# Patient Record
Sex: FEMALE | Race: WHITE | HISPANIC OR LATINO | Employment: FULL TIME | URBAN - METROPOLITAN AREA
[De-identification: names, ages, dates, MRNs, and addresses within clinical notes are randomized per-mention and may not be internally consistent; named-entity substitution may affect disease eponyms.]

---

## 2017-01-19 ENCOUNTER — GENERIC CONVERSION - ENCOUNTER (OUTPATIENT)
Dept: OTHER | Facility: OTHER | Age: 52
End: 2017-01-19

## 2017-04-03 ENCOUNTER — ALLSCRIPTS OFFICE VISIT (OUTPATIENT)
Dept: OTHER | Facility: OTHER | Age: 52
End: 2017-04-03

## 2017-04-06 ENCOUNTER — LAB CONVERSION - ENCOUNTER (OUTPATIENT)
Dept: OTHER | Facility: OTHER | Age: 52
End: 2017-04-06

## 2017-04-06 LAB
FERRITIN SERPL-MCNC: 36 NG/ML (ref 10–232)
IRON SATN MFR SERPL: 15 % (CALC) (ref 11–50)
IRON SERPL-MCNC: 50 MCG/DL (ref 40–190)
TIBC SERPL-MCNC: 323 MCG/DL (CALC) (ref 250–450)
TRANSFERRIN (HISTORICAL): 226 MG/DL (ref 188–341)

## 2017-09-15 ENCOUNTER — ALLSCRIPTS OFFICE VISIT (OUTPATIENT)
Dept: OTHER | Facility: OTHER | Age: 52
End: 2017-09-15

## 2017-09-15 DIAGNOSIS — E06.3 AUTOIMMUNE THYROIDITIS: ICD-10-CM

## 2017-09-15 DIAGNOSIS — E03.9 HYPOTHYROIDISM: ICD-10-CM

## 2017-10-04 ENCOUNTER — ALLSCRIPTS OFFICE VISIT (OUTPATIENT)
Dept: OTHER | Facility: OTHER | Age: 52
End: 2017-10-04

## 2017-10-05 NOTE — PROGRESS NOTES
Assessment  1  Systemic lupus erythematosus (710 0) (M32 9)   2  Autoimmune hemolytic anemia (283 0) (D59 1)   3  Hashimoto's thyroiditis (245 2) (E06 3)   4  Screening for malignant neoplasm of cervix (V76 2) (Z12 4)   5  Need for vaccination (V05 9) (Z23)   6  Screening for colon cancer (V76 51) (Z12 11)   7  Tinea corporis (110 5) (B35 4)    Plan  Hashimoto's thyroiditis    · (1) THYROXINE; Status:Active; Requested for:04Oct2017;    · (1) TSH; Status:Active; Requested XKX:62AVY0026;    · Call [phone] if: The symptoms are not better in 7 days ; Status:Complete;   Done:  21BDP7158 10:32AM   · Call [phone] if: You begin to have tremors or your hands become shaky ;  Status:Complete;   Done: 84FBF6398 10:32AM   · Call [phone] if: You gain or lose over 10 pounds without a change in your diet ;  Status:Complete;   Done: 49FCX9987 10:32AM   · Call [phone] if: You have symptoms of anxiety ; Status:Complete;   Done:  90XBO2597 10:32AM   · Call [phone] if: You lose weight without trying to ; Status:Complete;   Done:  56ANJ9078 10:32AM   · Call [phone] if: Your loss of memory seems to be worse ; Status:Complete;    Done: 82BVZ9671 10:32AM   · Call 911 if: You are having trouble staying awake ; Status:Complete;   Done: 97FMQ7439  10:32AM   · Call 911 if: You experience a new kind of chest pain (angina) or pressure ;  Status:Complete;   Done: 75DEW4393 10:32AM   · Call 911 if:  You experience a seizure ; Status:Complete;   Done: 22FGO5353 10:32AM   · Call 911 if: You have fainted or passed out ; Status:Complete;   Done: 57PHP3989  10:32AM   · Seek Immediate Medical Attention if: You are feeling short of breath ; Status:Complete;    Done: 03YIJ1067 10:32AM   · Seek Immediate Medical Attention if: You feel your heart is beating very fast or skipping  beats ; Status:Complete;   Done: 41ZPJ8935 10:32AM   · Seek Immediate Medical Attention if: You have signs of too much thyroid hormone ;  Status:Complete;   Done: 19FDJ2749 10:32AM  Need for vaccination    · Fluzone Quadrivalent Intramuscular Suspension; INJECT 0 5  ML  Intramuscular; To Be Done: 41VPW1887  Screening for colon cancer    · *1 -  [facility]  *  Status: Active   Requested for: 96SLL0628  Care Summary provided  : Yes  Screening for malignant neoplasm of cervix    · Gynecology Follow up Evaluation and Treatment  Follow-up  Status: Complete  Done:  39YBH7686  Systemic lupus erythematosus    · Avoid alcoholic beverages ; Status:Complete;   Done: 94HEF2051 10:31AM   · Avoid foods and beverages that contain caffeine ; Status:Complete;   Done: 44AGS0895  10:31AM   · Eat a normal well-balanced diet ; Status:Complete;   Done: 86GDT9767 10:31AM   · Call [phone] if: The symptoms are not better in 7 days ; Status:Complete;   Done:  75NBF6889 10:31AM   · Call [phone] if: The symptoms seem worse ; Status:Complete;   Done:  32YQA0952 10:31AM   · Call [phone] if: You have feelings of extreme sadness and feelings of  hopelessness ; Status:Complete;   Done: 39OQM8469 10:31AM   · Call [phone] if: You have pain in your abdomen ; Status:Complete;   Done:  44UMY1433 10:31AM   · Call [phone] if: You have pain in your chest ; Status:Complete;   Done: 90OMN9077  10:31AM   · Call [phone] if: Your eyesight becomes blurry or you have difficulty seeing ;  Status:Complete;   Done: 28JKP8169 10:31AM   · Call [phone] if:  Your temperature is higher than 101F ; Status:Complete;   Done:  09ABG6428 10:31AM   · Call 911 if: You are considering suicide ; Status:Complete;   Done: 04DTG9775 10:31AM   · Call 911 if: You are thinking about harming yourself or someone else ; Status:Complete;    Done: 70SOQ5801 10:31AM   · Seek Immediate Medical Attention if: You are feeling short of breath ; Status:Complete;    Done: 17OSW2662 10:31AM   · Seek Immediate Medical Attention if: You develop double vision (see two of everything) ;  Status:Complete;   Done: 45BRM2965 10:31AM   · Seek Immediate Medical Attention if: You experience a seizure ; Status:Complete;   Done:  83GFO4403 10:31AM   · Seek Immediate Medical Attention if: You have fainted or passed out ; Status:Complete;    Done: 65JOQ3583 10:31AM   · Seek Immediate Medical Attention if: You have pain in the chest that gets worse with  deep breathing or coughing ; Status:Complete;   Done: 11XPP0671 10:31AM   · Seek Immediate Medical Attention if: You have pain in the kidney or low back area ;  Status:Complete;   Done: 79PIG3992 10:31AM   · Seek Immediate Medical Attention if: You lose your vision for a short period of time ;  Status:Complete;   Done: 52HJB1814 10:31AM  Tinea corporis    · Clotrimazole 1 % External Cream; apply small amount to effected area BID for 10  days    Chief Complaint  [address] UP THYROID AND ANEMIA      History of Present Illness  Patient is here for followup of lupus, autoimmune hemolytic anemia, hashimoto's thyroiditis Kurt last saw the rheumatologist in August and there is labs pendind Stephanie Blake states she was told her hemoglobin was 8 2 SHe was placed on prednisone and is off it now and that is why she is having further labs done She is due for TSh and thyroixine level and will be following up here for that She needs to see her GYN who is through [facility] she will also need colon cancer screening She has a rash on the left calf that is itchy and circular in nature presnt for one week   The patient is being seen for follow-up of Hashimoto's thyroiditis  The patient reports doing well  Interval symptoms:  denies weight gain,-denies cold intolerance,-denies fatigue,-denies weakness,-denies constipation,-denies menorrhagia,-denies dyspnea on exertion,-denies trouble concentrating,-denies hair loss-and-denies dry skin     Associated symptoms: no myalgias,-no arthralgias,-no paresthesias,-no depression,-no leg swelling,-no weight loss-and-no palpitations  Medications:  the patient is adherent to her medication regimen, but-she denies medication side effects  Disease management:  the patient is doing well with her goals  Due for: thyroid stimulating hormone and free T4  The patient is being seen for follow-up of systemic lupus erythematosus  The patient reports doing well  Interval symptoms:  denies fever,-stable joint pain,-denies anorexia,-denies malaise,-denies malar facial rash,-denies skin lesions,-denies oral ulcers,-denies eye pain,-denies eye redness,-denies vision problems,-denies shortness of breath-and-denies chest pain  Associated symptoms: no weight loss,-no pallor,-no alopecia,-no nail changes,-no cough,-no dysuria,-no nausea,-no vomiting,-no diarrhea,-no headaches,-no myalgia,-no psychosis-and-no delirium  Medications:  the patient is adherent to her medication regimen, but-she denies medication side effects  Disease management:  The patient is doing well with her goals  Review of Systems    Constitutional: No fever, no chills, feels well, no tiredness, no recent weight gain or weight loss  Eyes: no eye pain-and-no eyesight problems  ENT: no complaints of earache, no loss of hearing, no nose bleeds, no nasal discharge, no sore throat, no hoarseness  Cardiovascular: No complaints of slow heart rate, no fast heart rate, no chest pain, no palpitations, no leg claudication, no lower extremity edema  Respiratory: No complaints of shortness of breath, no wheezing, no cough, no SOB on exertion, no orthopnea, no PND  Gastrointestinal: No complaints of abdominal pain, no constipation, no nausea or vomiting, no diarrhea, no bloody stools  Genitourinary: no dysuria-and-no incontinence  Musculoskeletal: no arthralgias-and-no myalgias  The patient presents with complaints of sudden onset of constant episodes of mild left leg a rash  Episodes started about 1 week ago   She is currently experiencing a rash  Symptoms are improved by antihistamines  Symptoms are made worse by itch-scratch cycle  Symptoms are unchanged  Pertinent Medical History: impaired immunity, but not allergic rhinitis  Family History: no allergic rhinitis, no asthma, no eczema, no psoriasis, no food allergies, no hay fever and no skin reactions  Neurological: No complaints of headache, no confusion, no convulsions, no numbness, no dizziness or fainting, no tingling, no limb weakness, no difficulty walking  Active Problems  1  Allergic rhinitis (477 9) (J30 9)   2  Breast cancer screening (V76 10) (Z12 39)   3  Hashimoto's thyroiditis (245 2) (E06 3)   4  High risk medication use (V58 69) (Z79 899)   5  Screening for malignant neoplasm of cervix (V76 2) (Z12 4)   6  Systemic lupus erythematosus (710 0) (M32 9)   7  Visit for screening mammogram (V76 12) (Z12 31)    Past Medical History  1  History of Allergic conjunctivitis (372 14) (H10 10)   2  History of anemia (V12 3) (Z86 2)   3  History of fatigue (V13 89) (Z87 898)   4  History of Hashimoto thyroiditis (V12 29) (Z86 39)   5  History of thyroiditis (V12 29) (Z86 39)    The active problems and past medical history were reviewed and updated today  Surgical History  1  History of  Section   2  History of Oophorectomy - Unilateral (Removal Of One Ovary)   3  History of Tubal Ligation    The surgical history was reviewed and updated today  Family History  Mother    1  Family history of Depression   2  Denied: Family history of mental disorder   3  Family history of Hypertension (V17 49)   4  Denied: Family history of Substance abuse or dependence   5  Family history of Type 2 Diabetes Mellitus  Father    6  Denied: Family history of mental disorder   7  Family history of Hypertension (V17 49)   8  Denied: Family history of Substance abuse or dependence  Sibling    9  Denied: Family history of mental disorder   10   Denied: Family history of Substance abuse or dependence  Sister    6  Family history of Anemia (V18 2)   12  Family history of Thyroid Disorder (V18 19)  Brother    15  Family history of Hyperlipidemia   15  Family history of Hypertension (V17 49)    The family history was reviewed and updated today  Social History   · Denied: History of Drug Use   · Never A Smoker   · Never Drank Alcohol  The social history was reviewed and is unchanged  Current Meds   1  Levothyroxine Sodium 88 MCG Oral Tablet; take 1 tablet daily Monday to Saturday; Therapy: 61DDP3597 to (Evaluate:10Sep2018)  Requested for: 36Ndz6258; Last   Rx:77Ntm8966 Ordered   2  Multi-Vitamin Oral Tablet; Take 1 tablet daily; Therapy: 99SME9028 to (Last Rx:10Mar2016) Ordered   3  Plaquenil 200 MG Oral Tablet; TAKE 1 TABLET DAILY; Therapy: (Recorded:10Jun2013) to Recorded    The medication list was reviewed and updated today  Allergies  1  No Known Drug Allergies    Vitals  Vital Signs    Recorded: 88HED4217 09:59AM   Temperature 21 2 F   Systolic 214   Diastolic 68   Height 5 ft 1 in   Weight 132 lb 8 oz   BMI Calculated 25 04   BSA Calculated 1 59     Physical Exam    Constitutional   General appearance: No acute distress, well appearing and well nourished  Eyes   Conjunctiva and lids: No swelling, erythema or discharge  Pupils and irises: Equal, round and reactive to light  Ears, Nose, Mouth, and Throat   External inspection of ears and nose: Normal     Otoscopic examination: Tympanic membranes translucent with normal light reflex  Canals patent without erythema  Nasal mucosa, septum, and turbinates: Normal without edema or erythema  Oropharynx: Normal with no erythema, edema, exudate or lesions  Pulmonary   Respiratory effort: No increased work of breathing or signs of respiratory distress  Auscultation of lungs: Clear to auscultation  Cardiovascular   Auscultation of heart: Normal rate and rhythm, normal S1 and S2, without murmurs      Examination of extremities for edema and/or varicosities: Normal     Carotid pulses: Normal     Abdomen   Abdomen: Non-tender, no masses  Liver and spleen: No hepatomegaly or splenomegaly  Musculoskeletal   Gait and station: Normal     Skin   Skin and subcutaneous tissue: Abnormal  -raised red circular rash with central clearing  Neurologic   Cranial nerves: Cranial nerves 2-12 intact      Psychiatric   Orientation to person, place, and time: Normal     Mood and affect: Normal          Signatures   Electronically signed by : Francis Pickard DO; Oct  4 2017 10:32AM EST                       (Author)

## 2017-10-26 NOTE — PROGRESS NOTES
Assessment  1  Hypothyroidism (244 9) (E03 9)   2  Hashimoto's thyroiditis (245 2) (E06 3)    Plan  Hashimoto's thyroiditis, Hypothyroidism    · Follow-up PRN Evaluation and Treatment  Follow-up  Status: Complete  Done:  85MYG7524   Ordered; For: Hashimoto's thyroiditis, Hypothyroidism; Ordered By: Lanre Vazquez Performed:  Due: 53BKC8634   · (1) TSH; Status:Active; Requested for:60Xfz2812;    Perform:[facility]; Due:07Tic5577; Ordered;For:Hashimoto's thyroiditis, Hypothyroidism; Ordered By:Adan Taveras Every;   · (1) T4, FREE; Status:Active; Requested for:30Iwn5844;    Perform:[facility]; Due:41Lai1465; Ordered;For:Hashimoto's thyroiditis, Hypothyroidism; Ordered By:Adan Taveras Every;  Hypothyroidism    · Levothyroxine Sodium 88 MCG Oral Tablet; take 1 tablet daily Monday to Saturday   Rx By: Lanre Vazquez; Dispense: 90 Days ; #:1 X 90 Tablet Bottle; Refill: 3;For: Hypothyroidism; OLAMIDE = N; Verified Transmission to [facility] #[id number]; Last Updated By: System, [employer]; 9/15/2017 9:51:09 AM    Discussion/Summary  Hypothyroidism secondary to Hashimoto thyroiditis-will add TSH and T4 to the labs that she had drawn earlier today  If the TSH is normal to continue levothyroxin at current dose and follow-up with primary care from here onwards   Counseling Documentation With Imm: The patient was counseled regarding diagnostic results,-- instructions for management,-- risk factor reductions,-- impressions,-- risks and benefits of treatment options,-- importance of compliance with treatment  Patient's Capacity to Self-Care: Patient is able to Self-Care  Medication SE Review and Pt Understands Tx: The treatment plan was reviewed with the patient/guardian  The patient/guardian understands and agrees with the treatment plan      Chief Complaint  Chief Complaint Free Text Note Form: Follow up  History of Present Illness  Hypothyroidism: The patient is being seen for follow-up of hypothyroidism   The patient has Hashimoto's thyroiditis  Current treatment includes levothyroxine  Symptoms:  no fatigue,-- no weight gain,-- no weight loss,-- no palpitations,-- no constipation,-- no diarrhea,-- no dysphagia,-- no neck swelling,-- no neck pain,-- no hoarseness,-- no myalgias,-- no arthralgias-- and-- no peripheral edema  Family history:  thyroid disorder  Review of Systems  ROS Reviewed:   ROS reviewed  Endo Adult ROS Female Established v2 Update - [facility]:   Constitutional/General: no recent weight gain,-- no recent weight loss,-- no poor energy/fatigue,-- no increased energy level,-- no insomnia/sleep problems,-- no fever-- and-- no feeling weak  Breasts: no nipple discharge  Heart: no high blood pressure,-- no chest pain/tightness,-- no rapid/racing heart rate-- and-- no palpitations  Genitourinary - Urinary: no frequent urination,-- no excess urination-- and-- no urinating during the night  Eyes: no blurred vision,-- no double vision,-- no bulging eyes,-- no gritty/scratchy eyes-- and-- no excessive tearing  Mouth / Throat: no hoarseness-- and-- no difficulty swallowing  Neck: no lumps,-- no swollen glands,-- no neck pain,-- no neck stiffness-- and-- no enlarged thyroid  Respiratory: no wheezing,-- no asthma-- and-- no persistent cough  Musculoskeletal: no muscle aches/pain,-- no joint aches/pain-- and-- no muscle weakness  Skin & Hair: no dry skin,-- no acne,-- the hair texture was not oily,-- no hair loss-- and-- no excessive hair growth  Gastrointestinal: no constipation,-- no diarrhea,-- no waking at night to drink-- and-- no stomach ache  Neurological: no blackouts,-- no weakness-- and-- no tremors  Reproductive: periods occur every 28 days,-- periods usually last 5 days,-- date of last menstruation 08/26/17,-- periods are regular,-- no discomfort with periods,-- no excessive bleeding with periods-- and-- no mood swings     Endocrine: feeling hot frequently,-- no feeling cold frequently,-- no shifts between feeling hot and cold,-- no cold hands or feet,-- excessive sweating,-- thyroid problems,-- no blood sugar problems,-- no excessive thirst,-- no excessive hunger,-- no change in shoe size,-- no nausea or vomiting-- and-- no shaky hands  Active Problems  1  Allergic conjunctivitis (372 14) (H10 10)   2  Allergic rhinitis (477 9) (J30 9)   3  Breast cancer screening (V76 10) (Z12 39)   4  Hashimoto's thyroiditis (245 2) (E06 3)   5  High risk medication use (V58 69) (Z79 899)   6  Hypothyroidism (244 9) (E03 9)   7  Iron deficiency anemia (280 9) (D50 9)   8  Screening for malignant neoplasm of cervix (V76 2) (Z12 4)   9  Systemic lupus erythematosus (710 0) (M32 9)   10  Visit for screening mammogram (V76 12) (Z12 31)    Past Medical History  1  History of anemia (V12 3) (Z86 2)   2  History of fatigue (V13 89) (Z87 898)   3  History of Hashimoto thyroiditis (V12 29) (Z86 39)   4  History of thyroiditis (V12 29) (Z86 39)  Active Problems And Past Medical History Reviewed: The active problems and past medical history were reviewed and updated today  Surgical History  1  History of  Section   2  History of Oophorectomy - Unilateral (Removal Of One Ovary)   3  History of Tubal Ligation  Surgical History Reviewed: The surgical history was reviewed and updated today  Family History  Mother    1  Family history of Depression   2  Denied: Family history of mental disorder   3  Family history of Hypertension (V17 49)   4  Denied: Family history of Substance abuse or dependence   5  Family history of Type 2 Diabetes Mellitus  Father    6  Denied: Family history of mental disorder   7  Family history of Hypertension (V17 49)   8  Denied: Family history of Substance abuse or dependence  Sibling    9  Denied: Family history of mental disorder   10  Denied: Family history of Substance abuse or dependence  Sister    6  Family history of Anemia (V18 2)   12   Family history of Thyroid Disorder (V18 19)  Brother    13  Family history of Hyperlipidemia   15  Family history of Hypertension (V17 49)  Family History Reviewed: The family history was reviewed and updated today  Social History   · Denied: History of Drug Use   · Never A Smoker   · Never Drank Alcohol  Social History Reviewed: The social history was reviewed and updated today  Current Meds   1  Levothyroxine Sodium 88 MCG Oral Tablet; take 1 tablet daily Monday to Saturday; Therapy: 42QSA9339 to (Mohsen Ludwig)  Requested for: 77Cwj6684; Last   Rx:75Nzf1780 Ordered   2  Multi-Vitamin Oral Tablet; Take 1 tablet daily; Therapy: 52ZEY3958 to (Last Rx:10Mar2016) Ordered   3  Plaquenil 200 MG Oral Tablet; TAKE 1 TABLET DAILY; Therapy: (Recorded:10Jun2013) to Recorded   4  PredniSONE 10 MG Oral Tablet; Take as directed Recorded  Medication List Reviewed: The medication list was reviewed and updated today  Allergies  1  No Known Drug Allergies    Vitals  Vital Signs    Recorded: 15Sep2017 09:34AM   Heart Rate 72   Systolic 96   Diastolic 70   Height 5 ft 1 in   Weight 130 lb 8 0 oz   BMI Calculated 24 66   BSA Calculated 1 57     Physical Exam    Constitutional   General appearance: No acute distress, well appearing and well nourished  Eyes   Conjunctiva and lids: No swelling, erythema, or discharge  Pupils: Equal, round and reactive to light  The sclera are anicteric  Extraocular movements are intact  Ears, Nose, Mouth, and Throat   External inspection of ears, nose and lips: Normal     Exam of Head: The head is atraumatic and normocephalic  Neck: The neck is supple  The thyroid is normal in size with no palpable nodules  Pulmonary   Auscultation of lungs: Clear to auscultation bilaterally with normal chest expansion  Cardiovascular   Auscultation of heart: Normal rate and rhythm with no murmurs, gallops or rubs      Examination of extremities for edema and/or varicosities: Normal  Abdomen   Abdomen: Abdomen is soft, non-tender with normal bowel sounds  Lymphatic   Palpation of lymph nodes: No supraclavicular or suboccipital lymphadenopathy  Musculoskeletal   Gait and station: Normal     Inspection/palpation of joints, bones, and muscles: Muscle bulk and tone is normal     Skin   Skin and subcutaneous tissue: Normal skin temperature and color  Neurologic   Motor Strength: Strength is 5/5 bilaterally      Psychiatric   Orientation to person, place and time: Normal     Mood and affect: Affect and attention span are normal        Future Appointments    Date/Time Provider Specialty Site   10/04/2017 10:00 AM Karolina Beaver DO Family Medicine [id number]     Signatures   Electronically signed by : RAIZA Garces ; Sep 15 2017 10:16AM EST                       (Author)    Electronically signed by : RAIZA Garces ; Sep 15 2017 10:17AM EST                       (Author)

## 2017-12-08 ENCOUNTER — ALLSCRIPTS OFFICE VISIT (OUTPATIENT)
Dept: OTHER | Facility: OTHER | Age: 52
End: 2017-12-08

## 2017-12-08 DIAGNOSIS — D50.9 IRON DEFICIENCY ANEMIA: ICD-10-CM

## 2017-12-11 NOTE — CONSULTS
Assessment    1  Anemia, iron deficiency (280 9) (D50 9)   2  Screening for colon cancer (V76 51) (Z12 11)    Plan  Anemia, iron deficiency    · Suprep Bowel Prep Kit 17 5-3 13-1 6 GM/180ML Oral Solution; DILUTE CONTENTSAND USE AS DIRECTED FOR BOWEL PREP   Rx By: Keke Maldonado; Dispense: 0 Days ; #:1 X 177 ML Bottle (2 Bottles); Refill: 0;For: Anemia, iron deficiency; OLAMIDE = N; Verified Transmission to [facility]/PH[facility]ACY #[id number]; Last Updated By: System, SureScripts; 12/8/2017 9:28:31 AM   · (1) IGA; Status:Active; Requested for:83Vdl7463;    Perform:[facility]; Due:35Tuk6790; Ordered; For:Anemia, iron deficiency; Ordered By:Donavon Mcdonald;   · (1) TISSUE TRANSGLUTAMINASE IGA; Status:Active; Requested for:32Gwu0051;    Perform:[facility]; Due:34Yqa0341; Ordered; For:Anemia, iron deficiency; Ordered By:Donavon Mcdonald;   · COLONOSCOPY (GI, SURG); Status:Active; Requested for:45Ueu3223;    Perform:[facility]; Order Comments:paula; SHIRLEY:[id number]; Last Updated By:Carl Basilio; 12/8/2017 9:43:18 AM;Ordered; For:Anemia, iron deficiency; Ordered By:Donavon Mcdonald;   · EGD; Status:Active; Requested for:95Ndw6558;    Perform:[facility]; Due:10Mby4854; Last Updated By:Carl Basilio; 12/8/2017 9:43:38 AM;Ordered;iron deficiency; Ordered By:Donavon Mcdonald;   · Follow-up PRN Evaluation and Treatment  Follow-up  Status: Complete  Done:63Ioe3528   Ordered; Anemia, iron deficiency; Ordered By: Keke Maldonado Performed:  Due: 61XOW0704    Discussion/Summary  Discussion Summary:   1  Colon cancer screening - We reviewed screening options including colonoscopy  Colonoscopy scheduled  Bowel prep instructions given  Anemia - etiology multifactorial including anemia of chronic disease and autoimmune hemolytic anemia  I will check celiac panel and schedule her for EGD for completion of work up   reviewed risks benefits and alternates of EGD and colonoscopy   Risks include but not limited to infection, bleeding, perforation, or missed lesion  Counseling Documentation With Imm: The patient was counseled regarding prognosis,-- patient and family education,-- impressions  Goals and Barriers: The patient has the current Goals: See above  The patent has the current Barriers: None  Chief Complaint  Chief Complaint Free Text Note Form: Colon consult  Pt states no active symptoms  Referred by Dr Herbert March  History of Present Illness  HPI: 82-year-old female with autoimmune hemolytic anemia, Hashimoto's thyroiditis and systemic lupus erythematous here for colon cancer screening  This is here initial screening colonoscopy consult  She denied abdominal pain, diarrhea, constipation, melena or hematochezia  She has anemia  Her hemoglobin was 8 g/dL in July but now improved to 11 g/dL  Review of Systems  Complete-Female GI Adult:  Constitutional: No fever, no chills, feels well, no tiredness, no recent weight gain or weight loss  Eyes: No complaints of eye pain, no red eyes, no eyesight problems, no discharge, no dry eyes, no itching of eyes  ENT: no complaints of earache, no loss of hearing, no nose bleeds, no nasal discharge, no sore throat, no hoarseness  Cardiovascular: No complaints of slow heart rate, no fast heart rate, no chest pain, no palpitations, no leg claudication, no lower extremity edema  Respiratory: No complaints of shortness of breath, no wheezing, no cough, no SOB on exertion, no orthopnea, no PND  Gastrointestinal: No complaints of abdominal pain, no constipation, no nausea or vomiting, no diarrhea, no bloody stools  Genitourinary: No complaints of dysuria, no incontinence, no pelvic pain, no dysmenorrhea, no vaginal discharge or bleeding  Musculoskeletal: No complaints of arthralgias, no myalgias, no joint swelling or stiffness, no limb pain or swelling  Integumentary: No complaints of skin rash or lesions, no itching, no skin wounds, no breast pain or lump    Neurological: No complaints of headache, no confusion, no convulsions, no numbness, no dizziness or fainting, no tingling, no limb weakness, no difficulty walking  Psychiatric: Not suicidal, no sleep disturbance, no anxiety or depression, no change in personality, no emotional problems  Endocrine: No complaints of proptosis, no hot flashes, no muscle weakness, no deepening of the voice, no feelings of weakness  Hematologic/Lymphatic: No complaints of swollen glands, no swollen glands in the neck, does not bleed easily, does not bruise easily  ROS Reviewed:   ROS reviewed  Active Problems  1  Allergic rhinitis (477 9) (J30 9)   2  Autoimmune hemolytic anemia (283 0) (D59 1)   3  Breast cancer screening (V76 10) (Z12 39)   4  Hashimoto's thyroiditis (245 2) (E06 3)   5  High risk medication use (V58 69) (Z79 899)   6  Need for vaccination (V05 9) (Z23)   7  Screening for colon cancer (V76 51) (Z12 11)   8  Screening for malignant neoplasm of cervix (V76 2) (Z12 4)   9  Systemic lupus erythematosus (710 0) (M32 9)   10  Tinea corporis (110 5) (B35 4)   11  Visit for screening mammogram (V76 12) (Z12 31)    Past Medical History  1  History of Allergic conjunctivitis (372 14) (H10 10)   2  History of anemia (V12 3) (Z86 2)   3  History of fatigue (V13 89) (Z87 898)   4  History of Hashimoto thyroiditis (V12 29) (Z86 39)   5  History of thyroiditis (V12 29) (Z86 39)  Active Problems And Past Medical History Reviewed: The active problems and past medical history were reviewed and updated today  Surgical History  1  History of  Section   2  History of Oophorectomy - Unilateral (Removal Of One Ovary)   3  History of Tubal Ligation  Surgical History Reviewed: The surgical history was reviewed and updated today  Family History  Mother    1  Family history of Depression   2  Denied: Family history of mental disorder   3  Family history of Hypertension (V17 49)   4   Denied: Family history of Substance abuse or dependence   5  Family history of Type 2 Diabetes Mellitus  Father    6  Denied: Family history of mental disorder   7  Family history of Hypertension (V17 49)   8  Denied: Family history of Substance abuse or dependence  Sibling    9  Denied: Family history of mental disorder   10  Denied: Family history of Substance abuse or dependence  Sister    6  Family history of Anemia (V18 2)   12  Family history of Thyroid Disorder (V18 19)  Brother    15  Family history of Hyperlipidemia   15  Family history of Hypertension (V17 49)  Family History Reviewed: The family history was reviewed and updated today  Social History     · Denied: History of Drug Use   · Never A Smoker   · Never Drank Alcohol  Social History Reviewed: The social history was reviewed and updated today  The social history was reviewed and is unchanged  Current Meds   1  Clotrimazole 1 % External Cream; apply small amount to effected area BID for 10 days; Therapy: 55ZJO8443 to (Last Rx:04Oct2017)  Requested for: 58OED6597 Ordered   2  Levothyroxine Sodium 88 MCG Oral Tablet; take 1 tablet daily Monday to Saturday; Therapy: 25GLB8296 to (Evaluate:62Tqi0732)  Requested for: 51LPA1038; Last Rx:27Nov2017 Ordered   3  Multi-Vitamin Oral Tablet; Take 1 tablet daily; Therapy: 18MPM4829 to (Last Rx:10Mar2016) Ordered   4  Plaquenil 200 MG Oral Tablet; TAKE 1 TABLET DAILY; Therapy: (Recorded:10Jun2013) to Recorded   5  PredniSONE 10 MG Oral Tablet; Take one tab daily; Therapy: 16IKG8618 to Recorded  Medication List Reviewed: The medication list was reviewed and updated today  Allergies  1   No Known Drug Allergies    Vitals  Vital Signs    Recorded: 79FUX7847 08:58AM   Temperature 96 6 F, Tympanic   Heart Rate 62   Systolic 733, LUE, Sitting   Diastolic 74, LUE, Sitting   Height 5 ft 1 in   Weight 131 lb 6 oz   BMI Calculated 24 82   BSA Calculated 1 58   O2 Saturation 99, RA       Physical Exam   Constitutional  General appearance: No acute distress, well appearing and well nourished  Ears, Nose, Mouth, and Throat  Oropharynx: Normal with no erythema, edema, exudate or lesions  Pulmonary  Respiratory effort: No increased work of breathing or signs of respiratory distress  Auscultation of lungs: Clear to auscultation  Cardiovascular  Auscultation of heart: Normal rate and rhythm, normal S1 and S2, without murmurs  Abdomen  Abdomen: Non-tender, no masses  Liver and spleen: No hepatomegaly or splenomegaly  Lymphatic  Palpation of lymph nodes in neck: No lymphadenopathy  Musculoskeletal  Gait and station: Normal    Skin  Skin and subcutaneous tissue: Normal without rashes or lesions     Psychiatric  Orientation to person, place, and time: Normal          Results/Data  (1) IRON SATURATION %, TIBC 04Apr2017 07:16AM Fiona Dejesus     Test Name Result Flag Reference   IRON, TOTAL 50 mcg/dL     IRON BINDING CAPACITY 323 mcg/dL (calc)  250-450   % SATURATION 15 % (calc)  11-50     Future Appointments    Date/Time Provider Specialty Site   01/17/2018 10:15 AM Adrienne Yan MD Gastroenterology Adult [address] ENDOSCOPY   04/04/2018 02:00 PM Fiona Dejesus DO Family Medicine [facility] PRACTICE       Signatures   Electronically signed by : Laxmi Smiley MD; Dec 10 2017  8:58PM EST                       (Author)

## 2018-01-12 RX ORDER — HYDROXYCHLOROQUINE SULFATE 200 MG/1
200 TABLET, FILM COATED ORAL DAILY
COMMUNITY

## 2018-01-12 RX ORDER — PREDNISONE 10 MG/1
10 TABLET ORAL DAILY
COMMUNITY
End: 2018-05-15 | Stop reason: DRUGHIGH

## 2018-01-12 RX ORDER — LEVOTHYROXINE SODIUM 88 UG/1
88 TABLET ORAL
COMMUNITY
End: 2018-02-25 | Stop reason: SDUPTHER

## 2018-01-12 RX ORDER — DIPHENOXYLATE HYDROCHLORIDE AND ATROPINE SULFATE 2.5; .025 MG/1; MG/1
1 TABLET ORAL DAILY
COMMUNITY
End: 2018-05-15

## 2018-01-12 RX ORDER — CLOTRIMAZOLE 1 %
CREAM (GRAM) TOPICAL 2 TIMES DAILY
Status: ON HOLD | COMMUNITY
End: 2018-01-17

## 2018-01-13 VITALS
SYSTOLIC BLOOD PRESSURE: 110 MMHG | WEIGHT: 132.5 LBS | BODY MASS INDEX: 25.02 KG/M2 | TEMPERATURE: 97.7 F | HEIGHT: 61 IN | DIASTOLIC BLOOD PRESSURE: 68 MMHG

## 2018-01-13 VITALS
HEIGHT: 61 IN | BODY MASS INDEX: 23.98 KG/M2 | WEIGHT: 127 LBS | SYSTOLIC BLOOD PRESSURE: 118 MMHG | DIASTOLIC BLOOD PRESSURE: 72 MMHG

## 2018-01-14 VITALS
HEIGHT: 61 IN | WEIGHT: 130.5 LBS | BODY MASS INDEX: 24.64 KG/M2 | SYSTOLIC BLOOD PRESSURE: 96 MMHG | DIASTOLIC BLOOD PRESSURE: 70 MMHG | HEART RATE: 72 BPM

## 2018-01-16 ENCOUNTER — ANESTHESIA EVENT (OUTPATIENT)
Dept: GASTROENTEROLOGY | Facility: MEDICAL CENTER | Age: 53
End: 2018-01-16
Payer: COMMERCIAL

## 2018-01-16 NOTE — RESULT NOTES
Message   ok, continue synthroid at current dose      Verified Results  (1) TSH [id number] 02:05PM [facility]     Test Name Result Flag Reference   TSH 0 993 uIU/mL  0 450-4 500     [facility]) Thyroxine (T4) Free, Direct, S 51Yka9796 02:05PM [facility]     Test Name Result Flag Reference   T4,Free(Direct) 1 17 ng/dL  0 82-1 77

## 2018-01-17 ENCOUNTER — HOSPITAL ENCOUNTER (OUTPATIENT)
Facility: MEDICAL CENTER | Age: 53
Setting detail: OUTPATIENT SURGERY
Discharge: HOME | End: 2018-01-17
Attending: INTERNAL MEDICINE | Admitting: INTERNAL MEDICINE
Payer: COMMERCIAL

## 2018-01-17 ENCOUNTER — ANESTHESIA (OUTPATIENT)
Dept: GASTROENTEROLOGY | Facility: MEDICAL CENTER | Age: 53
End: 2018-01-17
Payer: COMMERCIAL

## 2018-01-17 ENCOUNTER — GENERIC CONVERSION - ENCOUNTER (OUTPATIENT)
Dept: GASTROENTEROLOGY | Facility: CLINIC | Age: 53
End: 2018-01-17

## 2018-01-17 VITALS
TEMPERATURE: 98 F | HEART RATE: 62 BPM | OXYGEN SATURATION: 99 % | RESPIRATION RATE: 20 BRPM | SYSTOLIC BLOOD PRESSURE: 114 MMHG | BODY MASS INDEX: 25.32 KG/M2 | HEIGHT: 60 IN | DIASTOLIC BLOOD PRESSURE: 73 MMHG | WEIGHT: 129 LBS

## 2018-01-17 DIAGNOSIS — D50.9 IRON DEFICIENCY ANEMIA: ICD-10-CM

## 2018-01-17 PROCEDURE — 88305 TISSUE EXAM BY PATHOLOGIST: CPT | Performed by: INTERNAL MEDICINE

## 2018-01-17 PROCEDURE — 88342 IMHCHEM/IMCYTCHM 1ST ANTB: CPT | Performed by: INTERNAL MEDICINE

## 2018-01-17 RX ORDER — SODIUM CHLORIDE 9 MG/ML
125 INJECTION, SOLUTION INTRAVENOUS CONTINUOUS
Status: DISCONTINUED | OUTPATIENT
Start: 2018-01-17 | End: 2018-01-17 | Stop reason: HOSPADM

## 2018-01-17 RX ORDER — PROPOFOL 10 MG/ML
INJECTION, EMULSION INTRAVENOUS AS NEEDED
Status: DISCONTINUED | OUTPATIENT
Start: 2018-01-17 | End: 2018-01-17 | Stop reason: SURG

## 2018-01-17 RX ADMIN — PROPOFOL 150 MG: 10 INJECTION, EMULSION INTRAVENOUS at 09:39

## 2018-01-17 RX ADMIN — PROPOFOL 50 MG: 10 INJECTION, EMULSION INTRAVENOUS at 09:48

## 2018-01-17 RX ADMIN — PROPOFOL 50 MG: 10 INJECTION, EMULSION INTRAVENOUS at 09:54

## 2018-01-17 RX ADMIN — PROPOFOL 50 MG: 10 INJECTION, EMULSION INTRAVENOUS at 09:43

## 2018-01-17 RX ADMIN — HYDROCORTISONE SODIUM SUCCINATE 50 MG: 100 INJECTION, POWDER, FOR SOLUTION INTRAMUSCULAR; INTRAVENOUS at 09:35

## 2018-01-17 RX ADMIN — SODIUM CHLORIDE 125 ML/HR: 0.9 INJECTION, SOLUTION INTRAVENOUS at 09:27

## 2018-01-17 NOTE — OP NOTE
**** GI/ENDOSCOPY REPORT ****     PATIENT NAME: AMY LUGO - VISIT ID:  Patient ID: [id number]   YOB: 1965     INTRODUCTION: Esophagogastroduodenoscopy - A 50 female patient presents   for an outpatient Esophagogastroduodenoscopy at [address] [place] [address]  INDICATIONS: Anemia  CONSENT: The benefits, risks, and alternatives to the procedure were   discussed and informed consent was obtained from the patient  PREPARATION:  EKG, pulse, pulse oximetry and blood pressure were monitored   throughout the procedure  ASA Classification: Class 2 - Patient has mild   to moderate systemic disturbance that may or may not be related to the   disorder requiring surgery  Airway Assessment Classification: Airway class   2 - Visualization of the soft palate, fauces and uvula  Mallampati   Classification: Class 2 - Faucial pillars, soft palate visible  The   patient was kept NPO  MEDICATIONS: Anesthesia-check records     PROCEDURE:  The endoscope was passed without difficulty through the mouth   under direct visualization and advanced to the 2nd portion of the   duodenum  The scope was withdrawn and the mucosa was carefully examined  FINDINGS:  Esophagus: The esophagus appeared to be normal  Stomach:   Non-erosive gastritis was found in the antrum  Two cold forceps biopsies   was taken  Duodenum: The duodenal bulb, 2nd portion of the duodenum, and   major papilla appeared to be normal  Four random cold forceps biopsies was   taken  COMPLICATIONS: There were no complications  IMPRESSIONS: Normal esophagus  Non-erosive gastritis found in the antrum  Two biopsies taken  Normal duodenal bulb, 2nd portion of the duodenum, and   major papilla  Four biopsies taken  RECOMMENDATIONS: Follow-up on the results of the biopsy specimens  Colonoscopy today  ESTIMATED BLOOD LOSS:     PATHOLOGY SPECIMENS: Two cold forceps biopsies taken   Associated finding: Gastritis  Four cold forceps random biopsies taken  PROCEDURE CODES:     ICD-9 Codes:     ICD-10 Codes: D64 9 Anemia, unspecified K29 Gastritis and duodenitis     PERFORMED BY: RAIZA Alicea  on 01/17/2018  Version 1, electronically signed by RAIZA Stafford  on 01/17/2018   at 09:48

## 2018-01-17 NOTE — OP NOTE
**** GI/ENDOSCOPY REPORT ****     PATIENT NAME: Bianca Morales ------ VISIT ID:  Patient ID:   [id number] YOB: 1965     INTRODUCTION: Colonoscopy - A 50 female patient presents for an outpatient   Colonoscopy at [address] [place] [address]  PREVIOUS COLONOSCOPY: None     INDICATIONS: Anemia  CONSENT:  The benefits, risks, and alternatives to the procedure were   discussed and informed consent was obtained from the patient  PREPARATION: EKG, pulse, pulse oximetry and blood pressure were monitored   throughout the procedure  The patient was identified by myself both   verbally and by visual inspection of ID band  Airway Assessment   Classification: Airway class 2 - Visualization of the soft palate, fauces   and uvula  ASA Classification: See anesthesia record  MEDICATIONS: Anesthesia-check records     PROCEDURE:  The endoscope was passed without difficulty through the anus   under direct visualization and advanced to the cecum, confirmed by   appendiceal orifice and ileocecal valve  The scope was withdrawn and the   mucosa was carefully examined  The quality of the preparation was good  Cecal Intubation Time: 2 minutes(s) Scope Withdrawal Time: 9 minutes(s)     RECTAL EXAM: Normal rectal exam      FINDINGS:  Small internal hemorrhoids were found  Otherwise, the colon   appeared to be normal  The terminal ileum and entire colon appeared to be   normal      COMPLICATIONS: There were no complications  IMPRESSIONS: Internal hemorrhoids  Normal terminal ileum and entire colon  RECOMMENDATIONS: Colonoscopy recommended in 10 years  Discharge home when   standard parameters are met  Resume regular diet as tolerated       ESTIMATED BLOOD LOSS:     PATHOLOGY SPECIMENS:     PROCEDURE CODES:     ICD-9 Codes:     ICD-10 Codes: D64 9 Anemia, unspecified K64 9 Unspecified hemorrhoids     PERFORMED BY: RAIZA Villegas  on 01/17/2018  Version 1, electronically signed by RAIZA Stafford  on 01/17/2018   at 10:10

## 2018-01-17 NOTE — DISCHARGE INSTRUCTIONS
Gastritis   WHAT YOU NEED TO KNOW:   Gastritis is inflammation or irritation of the lining of your stomach  DISCHARGE INSTRUCTIONS:   Call 911 for any of the following:   · You develop chest pain or shortness of breath  Seek care immediately if:   · You vomit blood  · You have black or bloody bowel movements  · You have severe stomach or back pain  Contact your healthcare provider if:   · You have a fever  · You have new or worsening symptoms, even after treatment  · You have questions or concerns about your condition or care  Medicines:   · Medicines  may be given to help treat a bacterial infection or decrease stomach acid  · Take your medicine as directed  Contact your healthcare provider if you think your medicine is not helping or if you have side effects  Tell him or her if you are allergic to any medicine  Keep a list of the medicines, vitamins, and herbs you take  Include the amounts, and when and why you take them  Bring the list or the pill bottles to follow-up visits  Carry your medicine list with you in case of an emergency  Manage or prevent gastritis:   · Do not smoke  Nicotine and other chemicals in cigarettes and cigars can make your symptoms worse and cause lung damage  Ask your healthcare provider for information if you currently smoke and need help to quit  E-cigarettes or smokeless tobacco still contain nicotine  Talk to your healthcare provider before you use these products  · Do not drink alcohol  Alcohol can prevent healing and make your gastritis worse  Talk to your healthcare provider if you need help to stop drinking  · Do not take NSAIDs or aspirin unless directed  These and similar medicines can cause irritation  If your healthcare provider says it is okay to take NSAIDs, take them with food  · Do not eat foods that cause irritation  Foods such as oranges and salsa can cause burning or pain  Eat a variety of healthy foods   Examples include fruits (not citrus), vegetables, low-fat dairy products, beans, whole-grain breads, and lean meats and fish  Try to eat small meals, and drink water with your meals  Do not eat for at least 3 hours before you go to bed  · Find ways to relax and decrease stress  Stress can increase stomach acid and make gastritis worse  Activities such as yoga, meditation, or listening to music can help you relax  Spend time with friends, or do things you enjoy  Follow up with your healthcare provider as directed: You may need ongoing tests or treatment, or referral to a gastroenterologist  Write down your questions so you remember to ask them during your visits  © 2017 [facility] Information is for End User's use only and may not be sold, redistributed or otherwise used for commercial purposes  All illustrations and images included in CareNotes® are the copyrighted property of A D [employer] M [employer] Inc  or [employer]  The above information is an  only  It is not intended as medical advice for individual conditions or treatments  Talk to your doctor, nurse or pharmacist before following any medical regimen to see if it is safe and effective for you  Colonoscopy   WHAT YOU NEED TO KNOW:   A colonoscopy is a procedure to examine the inside of your colon (intestine) with a scope  Polyps or tissue growths may have been removed during your colonoscopy  It is normal to feel bloated and to have some abdominal discomfort  You should be passing gas  If you have hemorrhoids or you had polyps removed, you may have a small amount of bleeding  DISCHARGE INSTRUCTIONS:   Seek care immediately if:   · You have a large amount of bright red blood in your bowel movements  · Your abdomen is hard and firm and you have severe pain  · You have sudden trouble breathing  Contact your healthcare provider if:   · You develop a rash or hives      · You have a fever within 24 hours of your procedure  · You have not had a bowel movement for 3 days after your procedure  · You have questions or concerns about your condition or care  Activity:   · Do not lift, strain, or run  for 3 days after your procedure  · Rest after your procedure  You have been given medicine to relax you  Do not  drive or make important decisions until the day after your procedure  Return to your normal activity as directed  · Relieve gas and discomfort from bloating  by lying on your right side with a heating pad on your abdomen  You may need to take short walks to help the gas move out  Eat small meals until bloating is relieved  If you had polyps removed: For 7 days after your procedure:  · Do not  take aspirin  · Do not  go on long car rides  Help prevent constipation:   · Eat a variety of healthy foods  Healthy foods include fruit, vegetables, whole-grain breads, low-fat dairy products, beans, lean meat, and fish  Ask if you need to be on a special diet  Your healthcare provider may recommend that you eat high-fiber foods such as cooked beans  Fiber helps you have regular bowel movements  · Drink liquids as directed  Adults should drink between 9 and 13 eight-ounce cups of liquid every day  Ask what amount is best for you  For most people, good liquids to drink are water, juice, and milk  · Exercise as directed  Talk to your healthcare provider about the best exercise plan for you  Exercise can help prevent constipation, decrease your blood pressure and improve your health  Follow up with your healthcare provider as directed:  Write down your questions so you remember to ask them during your visits  © 2017 [facility] Information is for End User's use only and may not be sold, redistributed or otherwise used for commercial purposes  All illustrations and images included in CareNotes® are the copyrighted property of A D A M , Inc  or [employer]    The above information is an  only  It is not intended as medical advice for individual conditions or treatments  Talk to your doctor, nurse or pharmacist before following any medical regimen to see if it is safe and effective for you

## 2018-01-17 NOTE — ANESTHESIA PREPROCEDURE EVALUATION
Review of Systems/Medical History  Patient summary reviewed  Chart reviewed      Cardiovascular  Negative cardio ROS    Pulmonary  Negative pulmonary ROS        GI/Hepatic  Negative GI/hepatic ROS               Endo/Other  History of thyroid disease , hypothyroidism,      GYN       Hematology  No anemia ,     Musculoskeletal  Negative musculoskeletal ROS   Comment: SLE on plaquinil and Prednisone      Neurology  Negative neurology ROS      Psychology   Negative psychology ROS              Physical Exam    Airway    Mallampati score: II  TM Distance: >3 FB  Neck ROM: full     Dental   No notable dental hx     Cardiovascular  Comment: Negative ROS, Rhythm: regular, Rate: normal, Cardiovascular exam normal    Pulmonary  Pulmonary exam normal Breath sounds clear to auscultation,     Other Findings        Anesthesia Plan  ASA Score- 3     Anesthesia Type- IV sedation with anesthesia with ASA Monitors  Additional Monitors:   Airway Plan:         Plan Factors-    Induction- intravenous  Postoperative Plan-     Informed Consent- Anesthetic plan and risks discussed with patient

## 2018-01-18 NOTE — RESULT NOTES
Message   Normal      Verified Results  (1) TSH [id number] 09:50AM [facility]pper     Test Name Result Flag Reference   TSH 0 482 uIU/mL  0 450-4 500       Signatures   Electronically signed by : Beverley Francois, ; Mar 11 2016  5:30PM EST                       (Author)

## 2018-01-22 ENCOUNTER — GENERIC CONVERSION - ENCOUNTER (OUTPATIENT)
Dept: OTHER | Facility: OTHER | Age: 53
End: 2018-01-22

## 2018-01-23 VITALS
HEART RATE: 62 BPM | HEIGHT: 61 IN | SYSTOLIC BLOOD PRESSURE: 106 MMHG | WEIGHT: 131.38 LBS | BODY MASS INDEX: 24.8 KG/M2 | DIASTOLIC BLOOD PRESSURE: 74 MMHG | OXYGEN SATURATION: 99 % | TEMPERATURE: 96.6 F

## 2018-01-24 NOTE — RESULT NOTES
Verified Results  (1) TISSUE EXAM [id number] 09:40AM Danis Keys     Test Name Result Flag Reference   LAB AP CASE REPORT (Report)     Surgical Pathology Report             Case: [id number]                   Authorizing Provider: Myke Roche MD      Collected:      01/17/2018 0940        Ordering Location:   [facility]    Received:      01/17/2018 [address] [facility],[address] [facility] Endoscopy                            Pathologist:      Jason Burnett MD                             Specimens:  A) - Duodenum, Duodenum biopsy r/o celiac                               B) - Stomach, Antrum biopsy r/o h  pylori   LAB AP FINAL DIAGNOSIS (Report)     A  Duodenum, biopsy:    - No significant histologic abnormality     - No increase in intraepithelial lymphocytes, villous blunting, acute   and chronic inflammation     - Negative for granulomas, dysplasia and malignancy  B  Gastric antrum, biopsy:    - No significant histologic abnormality     - Negative for Helicobacter pylori organisms (immunohistochemical stain   with appropriate positive control)  Electronically signed by Jason Burnett MD on 1/19/2018 at 12:49 PM   LAB AP NOTE      Interpretation performed at [facility], [address] [place] [address]   LAB AP SURGICAL ADDITIONAL INFORMATION (Report)     All controls performed with the immunohistochemical stains reported above   reacted appropriately  These tests were developed and their performance   characteristics determined by [facility] or   [facility]  They may not be cleared or approved by the [place]  Food and Drug Administration  The FDA has determined that such clearance   or approval is not necessary  These tests are used for clinical purposes  They should not be regarded as investigational or for research   This   laboratory has been approved by CLIA 88, designated as a high-complexity   laboratory and is qualified to perform these tests    LAB AP GROSS DESCRIPTION (Report)     A  The specimen is received in formalin, labeled with the patient's name   and hospital number, and is designated duodenal biopsy rule out celiac  The specimen consists of 5 tan-pink soft tissue fragments ranging from   0 1-0 3 cm in greatest dimension  The specimen is entirely submitted in 1   cassette  B  The specimen is received in formalin, labeled with the patient's name   and hospital number, and is designated antral biopsy rule out H pylori  The specimen consists of a single tan-pink soft tissue fragment measuring   0 5 cm in greatest dimension  The specimen is entirely submitted in 1   cassette  Note: The estimated total formalin fixation time based upon information   provided by the submitting clinician and the standard processing schedule   is under 72 hours       Elraarnel See

## 2018-01-30 ENCOUNTER — TELEPHONE (OUTPATIENT)
Dept: GASTROENTEROLOGY | Facility: MEDICAL CENTER | Age: 53
End: 2018-01-30

## 2018-01-30 NOTE — TELEPHONE ENCOUNTER
Left message for patient to call office we have results from [facility], Bx from stomach was neg for h pylori and celiac disease

## 2018-02-25 DIAGNOSIS — E03.9 ACQUIRED HYPOTHYROIDISM: Primary | ICD-10-CM

## 2018-02-26 RX ORDER — LEVOTHYROXINE SODIUM 88 UG/1
TABLET ORAL
Qty: 90 TABLET | Refills: 0 | Status: SHIPPED | OUTPATIENT
Start: 2018-02-26 | End: 2018-05-27 | Stop reason: SDUPTHER

## 2018-05-15 ENCOUNTER — OFFICE VISIT (OUTPATIENT)
Dept: FAMILY MEDICINE CLINIC | Facility: CLINIC | Age: 53
End: 2018-05-15
Payer: COMMERCIAL

## 2018-05-15 VITALS
SYSTOLIC BLOOD PRESSURE: 110 MMHG | WEIGHT: 127.6 LBS | HEIGHT: 60 IN | DIASTOLIC BLOOD PRESSURE: 72 MMHG | BODY MASS INDEX: 25.05 KG/M2

## 2018-05-15 DIAGNOSIS — E06.3 HYPOTHYROIDISM DUE TO HASHIMOTO'S THYROIDITIS: Primary | ICD-10-CM

## 2018-05-15 DIAGNOSIS — D59.10 AIHA (AUTOIMMUNE HEMOLYTIC ANEMIA) (HCC): ICD-10-CM

## 2018-05-15 DIAGNOSIS — M32.9 SYSTEMIC LUPUS ERYTHEMATOSUS, UNSPECIFIED SLE TYPE, UNSPECIFIED ORGAN INVOLVEMENT STATUS (HCC): ICD-10-CM

## 2018-05-15 DIAGNOSIS — J30.1 SEASONAL ALLERGIC RHINITIS DUE TO POLLEN: ICD-10-CM

## 2018-05-15 DIAGNOSIS — Z13.220 SCREENING, LIPID: ICD-10-CM

## 2018-05-15 DIAGNOSIS — E03.8 HYPOTHYROIDISM DUE TO HASHIMOTO'S THYROIDITIS: Primary | ICD-10-CM

## 2018-05-15 DIAGNOSIS — D50.0 IRON DEFICIENCY ANEMIA DUE TO CHRONIC BLOOD LOSS: ICD-10-CM

## 2018-05-15 PROBLEM — D50.9 ANEMIA, IRON DEFICIENCY: Status: ACTIVE | Noted: 2017-12-08

## 2018-05-15 PROBLEM — N92.4 EXCESSIVE BLEEDING IN PREMENOPAUSAL PERIOD: Status: ACTIVE | Noted: 2018-05-15

## 2018-05-15 PROCEDURE — 1036F TOBACCO NON-USER: CPT | Performed by: FAMILY MEDICINE

## 2018-05-15 PROCEDURE — 99214 OFFICE O/P EST MOD 30 MIN: CPT | Performed by: FAMILY MEDICINE

## 2018-05-15 PROCEDURE — 3008F BODY MASS INDEX DOCD: CPT | Performed by: FAMILY MEDICINE

## 2018-05-15 RX ORDER — ELECTROLYTES/DEXTROSE
SOLUTION, ORAL ORAL
COMMUNITY
End: 2018-05-15

## 2018-05-15 RX ORDER — MEDROXYPROGESTERONE ACETATE 10 MG/1
10 TABLET ORAL
COMMUNITY
Start: 2018-04-13 | End: 2018-05-15

## 2018-05-15 RX ORDER — PREDNISONE 1 MG/1
TABLET ORAL
Refills: 6 | COMMUNITY
Start: 2018-04-02 | End: 2019-12-03

## 2018-05-15 NOTE — PROGRESS NOTES
Assessment/Plan:    Hypothyroidism due to Hashimoto's thyroiditis  Patient will continue synthroid at 88 mcg Patient to have labs done    Allergic rhinitis  Patient to start loratidine    AIHA (autoimmune hemolytic anemia) ([place] [address] )  Patient to continue prednisone per hematology    Anemia, iron deficiency  Stable Patient to have repeat labs She had heavy menstrual bleeding in March to mid April she was given provera and that stopped it She richey ee GYN    Excessive bleeding in premenopausal period  Patient to contact Gyn to arrange the pelvic ultrasound    Systemic lupus erythematosus ([place] [address] )  Patient to continue iwht plaquenil and yearly eye exam Plus see the rheumatologist        Diagnoses and all orders for this visit:    Hypothyroidism due to Hashimoto's thyroiditis    Systemic lupus erythematosus, unspecified SLE type, unspecified organ involvement status ([place] [address] )    AIHA (autoimmune hemolytic anemia) ([facility])    Iron deficiency anemia due to chronic blood loss    Seasonal allergic rhinitis due to pollen    Other orders  -     Discontinue: medroxyPROGESTERone (PROVERA) 10 mg tablet; Take 10 mg by mouth  -     predniSONE 5 mg tablet; 1 TO 2 DAILY AS DIRECTED  -     Discontinue: Multiple Vitamins-Minerals (MULTIVITAMIN ADULT) TABS; Take by mouth  -     Multiple Vitamin (MULTI-VITAMIN DAILY PO); Take 1 tablet by mouth daily          Subjective:   Chief Complaint   Patient presents with    Hypothyroidism          Patient ID: Jass Stone is a 50 y o  female      Patient is here for followup of SLE, sutoimmune hemolytic anemia, hashimoto's hypothyroidism, menorrhage and her iron deficient anemai Patient is seeing the rheumatologist She is taking her plaquenil and having eye exam done yearly Patient rheumatologic labs are stable patient is on prednisone 5 mg daily for the hemolytic anmia Patient last Hemoglobin was a bit low at 9 5 However thtat could be due [place] the heavy menstrual cycle she had most of march patient was placed on provera to stop the bleeding Patient was also to have an [address] done and did not do that yet Patient ahs had mammogram, PAP and colonoscopy Patient is tolerating all meds She is due for thyroid testing         The following portions of the patient's history were reviewed and updated as appropriate: allergies, current medications, past social history and problem list     Review of Systems   Constitutional: Negative for fatigue, fever and unexpected weight change  HENT: Positive for congestion  Negative for sinus pain and trouble swallowing  Seasonal allergies   Eyes: Negative for discharge and visual disturbance  Respiratory: Negative for cough, chest tightness, shortness of breath and wheezing  Cardiovascular: Negative for chest pain, palpitations and leg swelling  Gastrointestinal: Negative for abdominal pain, blood in stool, constipation, diarrhea, nausea and vomiting  Genitourinary: Positive for menstrual problem  Negative for difficulty urinating, dysuria, frequency and hematuria  Musculoskeletal: Negative for arthralgias, gait problem and joint swelling  Skin: Negative for rash and wound  Allergic/Immunologic: Negative for environmental allergies and food allergies  Neurological: Negative for dizziness, syncope, weakness, numbness and headaches  Hematological: Negative for adenopathy  Does not bruise/bleed easily  Psychiatric/Behavioral: Negative for confusion, decreased concentration and sleep disturbance  The patient is not nervous/anxious  Objective:      /72   Ht 5' (1 524 m)   Wt 57 9 kg (127 lb 9 6 oz)   BMI 24 92 kg/m²          Physical Exam   Constitutional: She is oriented to person, place, and time  She appears well-developed and well-nourished  HENT:   Head: Normocephalic and atraumatic     Right Ear: Hearing, tympanic membrane and external ear normal    Left Ear: Hearing, tympanic membrane and external ear normal    Eyes: Conjunctivae and EOM are normal  Pupils are equal, round, and reactive to light  Neck: Neck supple  No thyromegaly present  Cardiovascular: Normal rate and normal heart sounds  Pulmonary/Chest: Effort normal and breath sounds normal  She has no wheezes  She has no rales  Abdominal: Soft  Bowel sounds are normal  She exhibits no distension  There is no tenderness  Musculoskeletal: She exhibits no edema or tenderness  Lymphadenopathy:     She has no cervical adenopathy  Neurological: She is alert and oriented to person, place, and time  No cranial nerve deficit  Coordination normal    Skin: Skin is warm and dry  No rash noted  Psychiatric: She has a normal mood and affect   Her behavior is normal  Judgment and thought content normal

## 2018-05-15 NOTE — ASSESSMENT & PLAN NOTE
Stable Patient to have repeat labs She had heavy menstrual bleeding in March to mid April she was given provera and that stopped it She richey ee GYN

## 2018-05-15 NOTE — PATIENT INSTRUCTIONS
Connective Tissue Disorders   WHAT YOU NEED TO KNOW:   What is a connective tissue disorder? A connective tissue disorder can affect any connective tissue in your body  Connective tissues support your organs, attach muscles to bones, and create scar tissue after an injury  Cartilage is an example of a connective tissue  There are many types of connective tissue disorders, such as rheumatoid arthritis, lupus, and scleroderma  The most common affected areas are joints, muscles, and skin  Your organs, eyes, nervous system, and blood vessels can also be affected  What increases my risk for a connective tissue disorder? You might have been born with the disorder, or it may develop from any of the following:  · Healthy cells in your body are attacked by your immune system by mistake    · An injury that causes scar tissue to form    · A family history of a connective tissue disorder     · A lack of vitamin C, causing a connective tissue disorder called scurvy  What are the signs and symptoms of a connective tissue disorder? Signs and symptoms depend on the type of connective tissue disorder and if it is severe  Symptoms may be mild or severe, and may come and go:  · Fever or fatigue    · Skin rash or thickening, blisters, or sensitivity to sunlight    · Rash on your cheeks that goes across your nose    · Joint pain, swelling, or warmth    · Deformed joints, or limited range of motion    · Cold, numb, or swollen fingers    · Loss of appetite, or weight loss without trying    · Dry mouth or eyes, vision problems, or an eye infection such as conjunctivitis    · Hair loss  How is a connective tissue disorder diagnosed? You may have symptoms of several types of connective tissue disorders  This can make diagnosis difficult  Over time, you may develop one type of connective tissue disorder  · Blood tests  may be used to measure the amount of inflammation in your body or to check organ function   Blood tests may also be used to check for specific antibodies that are attacking healthy cells by mistake  · An x-ray, CT, or MRI  may be used to check your joints or organs for damage  Do not enter the MRI room with any metal  Metal can cause serious damage  Tell the healthcare provider if you have any metal in or on your body  · A biopsy  is a procedure used to take a sample of tissue to be tested  How is a connective tissue disorder treated? · Medicines  may be given to prevent your immune system from attacking healthy cells  You may also need medicines to stop the disease from getting worse  You may need to use topical creams or lotions to control a rash or other symptoms that affect your skin  · Prescription pain medicine  may be given  Ask your healthcare provider how to take this medicine safely  Some prescription pain medicines contain acetaminophen  Do not take other medicines that contain acetaminophen without talking to your healthcare provider  Too much acetaminophen may cause liver damage  Prescription pain medicine may cause constipation  Ask your healthcare provider how to prevent or treat constipation  · NSAIDs , such as ibuprofen, help decrease swelling, pain, and fever  This medicine is available with or without a doctor's order  NSAIDs can cause stomach bleeding or kidney problems in certain people  If you take blood thinner medicine, always ask your healthcare provider if NSAIDs are safe for you  Always read the medicine label and follow directions  · Acetaminophen  helps reduce pain and fever  Acetaminophen is available without a doctor's order  Ask how much to take and how often to take it  Follow directions  Acetaminophen can cause liver problems if not taken correctly  · Steroids  may be given to reduce swelling and pain  What can I do to manage my connective tissue disorder? · Rest as needed    Talk to your healthcare provider if you are having trouble sleeping because of pain or other symptoms  Rest your joints if they are stiff or painful  Your healthcare provider may suggest support devices such as crutches or splints to help your joints rest      · Eat a variety of healthy foods  Healthy foods include fruits, vegetables, lean meats, fish, and low-fat dairy products  Work with your healthcare provider or dietitian to create healthy meal plans  · Go to physical or occupational therapy as directed  A physical therapist can help you create an exercise plan  Exercise may help increase your energy  Exercise can also help keep stiff joints flexible and increase range of motion  An occupational therapist can help you learn to do your daily activities when you have pain or swelling  · Talk to your healthcare provider about pregnancy  If you are a woman and want to get pregnant, talk to your healthcare provider  You or your baby might be at risk for complications  You may need to wait until your disease is controlled or your medications are finished before you get pregnant  You may also have trouble getting pregnant because of your disease  Your healthcare provider may be able to suggest ways to improve your ability to become pregnant  · Do not smoke  Nicotine and other chemicals in cigarettes and cigars can cause blood vessel and lung damage  Ask your healthcare provider for information if you currently smoke and need help to quit  E-cigarettes or smokeless tobacco still contain nicotine  Talk to your healthcare provider before you use these products  · Manage stress  Stress may slow healing and lead to illness  Learn ways to control stress, such as relaxation, deep breathing, or listening to music  What can I do to manage flares? A flare means something triggered your symptoms  Stress, cold weather, and sunlight are examples of triggers  Your healthcare provider can help you create a management plan that includes what to do if you have a flare   Treat flares quickly to help prevent serious illness  · Apply ice or heat as directed  Ice helps reduce pain and swelling, and may help prevent tissue damage  Use a cold compress, or put crushed ice in a bag  Cover it with a towel and apply to the painful area for 15 to 20 minutes every hour, or as directed  Heat helps reduce pain and muscle spasms  Apply a warm compress to the area for 20 minutes every 2 hours, or as directed  · Elevate the area above the level of your heart  Elevation can help reduce swelling and pain, especially in your joints  Elevate the area as often as possible  · Keep your hands and feet warm  Certain connective tissue disorders can cause your hands and feet to become cold and painful  Over time, ulcers or gangrene (tissue death) may develop if frequent or severe attacks are not prevented  Dress warmly in cold weather, including gloves and thick socks  It may help to wiggle your fingers or toes to improve circulation  Call 911 for any of the following:   · You have trouble breathing, chest pain or pressure, or a fast heartbeat  · You are sweating, and your lips are pale or blue  · You are vomiting blood  · You have a high fever  When should I seek immediate care? · You lose feeling in your hands or feet  · You lose feeling on one side of your body  · You have sudden pain in your eyes and vision problems  When should I contact my healthcare provider? · You have trouble urinating, or you urinate less than usual     · You have trouble having a bowel movement, or you lose control of your bowel movements  · Your muscle or joint tightness worsens, or your fingers begin to curl  · Your symptoms get worse, even after treatment  · Your skin is itchy, swollen, or has a rash  · You have questions or concerns about your condition or care  CARE AGREEMENT:   You have the right to help plan your care  Learn about your health condition and how it may be treated   Discuss treatment options with your caregivers to decide what care you want to receive  You always have the right to refuse treatment  The above information is an  only  It is not intended as medical advice for individual conditions or treatments  Talk to your doctor, nurse or pharmacist before following any medical regimen to see if it is safe and effective for you  © 2017 [employer] [employer] Information is for End User's use only and may not be sold, redistributed or otherwise used for commercial purposes  All illustrations and images included in CareNotes® are the copyrighted property of [employer]  or [employer]

## 2018-05-27 DIAGNOSIS — E03.9 ACQUIRED HYPOTHYROIDISM: ICD-10-CM

## 2018-05-29 RX ORDER — LEVOTHYROXINE SODIUM 88 UG/1
TABLET ORAL
Qty: 90 TABLET | Refills: 0 | Status: SHIPPED | OUTPATIENT
Start: 2018-05-29 | End: 2018-08-24 | Stop reason: SDUPTHER

## 2018-08-24 DIAGNOSIS — E03.9 ACQUIRED HYPOTHYROIDISM: ICD-10-CM

## 2018-08-24 RX ORDER — LEVOTHYROXINE SODIUM 88 UG/1
TABLET ORAL
Qty: 78 TABLET | Refills: 0 | Status: SHIPPED | OUTPATIENT
Start: 2018-08-24 | End: 2018-11-20 | Stop reason: SDUPTHER

## 2018-11-20 DIAGNOSIS — E03.9 ACQUIRED HYPOTHYROIDISM: ICD-10-CM

## 2018-11-20 RX ORDER — LEVOTHYROXINE SODIUM 88 UG/1
TABLET ORAL
Qty: 30 TABLET | Refills: 0 | Status: SHIPPED | OUTPATIENT
Start: 2018-11-20 | End: 2018-12-03 | Stop reason: SDUPTHER

## 2018-11-21 DIAGNOSIS — E03.9 ACQUIRED HYPOTHYROIDISM: ICD-10-CM

## 2018-11-21 RX ORDER — LEVOTHYROXINE SODIUM 88 UG/1
TABLET ORAL
Qty: 78 TABLET | Refills: 0 | OUTPATIENT
Start: 2018-11-21

## 2018-12-03 ENCOUNTER — OFFICE VISIT (OUTPATIENT)
Dept: FAMILY MEDICINE CLINIC | Facility: CLINIC | Age: 53
End: 2018-12-03
Payer: COMMERCIAL

## 2018-12-03 VITALS
SYSTOLIC BLOOD PRESSURE: 110 MMHG | HEIGHT: 60 IN | DIASTOLIC BLOOD PRESSURE: 72 MMHG | BODY MASS INDEX: 26.86 KG/M2 | WEIGHT: 136.8 LBS

## 2018-12-03 DIAGNOSIS — M32.9 SYSTEMIC LUPUS ERYTHEMATOSUS, UNSPECIFIED SLE TYPE, UNSPECIFIED ORGAN INVOLVEMENT STATUS (HCC): ICD-10-CM

## 2018-12-03 DIAGNOSIS — E78.1 HYPERTRIGLYCERIDEMIA: ICD-10-CM

## 2018-12-03 DIAGNOSIS — E03.8 HYPOTHYROIDISM DUE TO HASHIMOTO'S THYROIDITIS: Primary | ICD-10-CM

## 2018-12-03 DIAGNOSIS — D50.0 IRON DEFICIENCY ANEMIA DUE TO CHRONIC BLOOD LOSS: ICD-10-CM

## 2018-12-03 DIAGNOSIS — D59.10 AIHA (AUTOIMMUNE HEMOLYTIC ANEMIA) (HCC): ICD-10-CM

## 2018-12-03 DIAGNOSIS — E06.3 HYPOTHYROIDISM DUE TO HASHIMOTO'S THYROIDITIS: Primary | ICD-10-CM

## 2018-12-03 DIAGNOSIS — E03.9 ACQUIRED HYPOTHYROIDISM: ICD-10-CM

## 2018-12-03 PROBLEM — N92.4 EXCESSIVE BLEEDING IN PREMENOPAUSAL PERIOD: Status: RESOLVED | Noted: 2018-05-15 | Resolved: 2018-12-03

## 2018-12-03 PROCEDURE — 99214 OFFICE O/P EST MOD 30 MIN: CPT | Performed by: FAMILY MEDICINE

## 2018-12-03 PROCEDURE — 3008F BODY MASS INDEX DOCD: CPT | Performed by: FAMILY MEDICINE

## 2018-12-03 RX ORDER — LEVOTHYROXINE SODIUM 88 UG/1
TABLET ORAL
Qty: 90 TABLET | Refills: 1 | Status: SHIPPED | OUTPATIENT
Start: 2018-12-03 | End: 2019-07-02 | Stop reason: DRUGHIGH

## 2018-12-03 NOTE — PROGRESS NOTES
Assessment/Plan:    Hypothyroidism due to Hashimoto's thyroiditis  Patient will have labs done after 12/15/2018 Patient to continue current care    [facility] )  Continue with diet modification check labs    Anemia, iron deficiency  Anemia is stable followup with hematology    AIHA (autoimmune hemolytic anemia) ([facility] )  cotninue with iron and follwoup with the hematologist       Diagnoses and all orders for this visit:    Hypothyroidism due to Hashimoto's thyroiditis  -     TSH, 3rd generation; Future  -     T4, free; Future    Systemic lupus erythematosus, unspecified SLE type, unspecified organ involvement status ([facility])    AIHA (autoimmune hemolytic anemia) ([facility])    Iron deficiency anemia due to chronic blood loss    Acquired hypothyroidism  -     levothyroxine 88 mcg tablet; 1 daily    Hypertriglyceridemia  -     Lipid panel; Future          Subjective:   Chief Complaint   Patient presents with    Hypothyroidism     review blood work  refill med 90 day           Patient ID: Tomasa Coates is a 48 y o  female  Patient is hre for follwoup of her lupus, anemia hypertriglyceridemia , hypothyroidism and also weight Patient is overall doing well She has gained about 5 pounds Patient is not exercising as she should Patient is trying to [address] She is following with the hematologist and the rheumatologis Her labs labs showed her blood count was stable Patient will continue with her current meds She is tolerating them well Her last GYN visit and [address] were reviewed and were normal Patient is voerall feeling well        The following portions of the patient's history were reviewed and updated as appropriate: allergies, current medications, past social history and problem list     Review of Systems   Constitutional: Negative for activity change, appetite change, chills, fatigue and fever     HENT: Negative for congestion, ear discharge, ear pain, hearing loss, postnasal drip, sinus pressure, sore throat and trouble swallowing  Eyes: Negative for pain, discharge, redness, itching and visual disturbance  Respiratory: Negative for cough, chest tightness and shortness of breath  Cardiovascular: Negative for chest pain, palpitations and leg swelling  Gastrointestinal: Negative for abdominal pain, blood in stool, constipation, diarrhea, nausea and vomiting  Endocrine: Negative for cold intolerance, heat intolerance, polydipsia, polyphagia and polyuria  Genitourinary: Negative for difficulty urinating, dysuria, menstrual problem, urgency, vaginal bleeding and vaginal discharge  Musculoskeletal: Negative for arthralgias, back pain, gait problem, myalgias, neck pain and neck stiffness  Skin: Negative for rash and wound  Allergic/Immunologic: Negative for environmental allergies and food allergies  Neurological: Negative for dizziness, tremors, seizures, weakness and headaches  Hematological: Negative for adenopathy  Does not bruise/bleed easily  Psychiatric/Behavioral: Negative for confusion and sleep disturbance  The patient is not nervous/anxious  Objective:      /72   Ht 5' (1 524 m)   Wt 62 1 kg (136 lb 12 8 oz)   BMI 26 72 kg/m²          Physical Exam   Constitutional: She is oriented to person, place, and time  She appears well-developed and well-nourished  HENT:   Head: Normocephalic and atraumatic  Right Ear: External ear normal    Left Ear: External ear normal    Nose: Nose normal    Mouth/Throat: Oropharynx is clear and moist    Eyes: Pupils are equal, round, and reactive to light  Conjunctivae and EOM are normal  Right eye exhibits no discharge  Left eye exhibits no discharge  Neck: Normal range of motion  Neck supple  No JVD present  No tracheal deviation present  No thyromegaly present  Cardiovascular: Normal rate, normal heart sounds and intact distal pulses  Pulmonary/Chest: Effort normal and breath sounds normal  She has no wheezes   She has no rales    Abdominal: Soft  Bowel sounds are normal  She exhibits no distension and no mass  There is no tenderness  There is no rebound  Musculoskeletal: Normal range of motion  Lymphadenopathy:     She has no cervical adenopathy  Neurological: She is alert and oriented to person, place, and time  She has normal reflexes  No cranial nerve deficit  Coordination normal    Skin: Skin is warm and dry  No rash noted  Psychiatric: She has a normal mood and affect  Her behavior is normal  Judgment and thought content normal    Nursing note and vitals reviewed

## 2018-12-03 NOTE — PATIENT INSTRUCTIONS
Hypothyroidism   WHAT YOU NEED TO KNOW:   What is hypothyroidism? Hypothyroidism is a condition that develops when the thyroid gland does not make enough thyroid hormone  Thyroid hormones help control body temperature, heart rate, growth, and weight  What causes hypothyroidism? If you have a family member with hypothyroidism, your risk is increased  Any of the following can cause hypothyroidism:  · Autoimmune disease, such as inflammation of your thyroid, or Hashimoto disease    · Surgery, radiation therapy, or medicines such as lithium, sedatives, or narcotics    · Thyroid cancer or viral infection    · Low iodine levels  What are the signs and symptoms of hypothyroidism? The signs and symptoms may develop slowly, sometimes over several years  · Exhaustion    · Sensitivity to cold    · Headaches or decreased concentration    · Muscle aches or weakness    · Constipation     · Dry, flaky skin or brittle nails    · Thinning hair    · Heavy or irregular monthly periods    · Depression or irritability  How is hypothyroidism diagnosed? Your healthcare provider will ask about your symptoms and what medicines you take  He will ask about your medical history and if anyone in your family has hypothyroidism  A blood test will show your thyroid hormone level  How is hypothyroidism treated? Thyroid hormone replacement medicine may bring your thyroid hormone level back to normal  Ask your healthcare provider for more information on other medicines you may need  Call 911 for any of the following:   · You have sudden chest pain or shortness of breath  · You have a seizure  · You feel like you are going to faint  When should I seek immediate care? · You have diarrhea, tremors, or trouble sleeping  · Your legs, ankles, or feet are swollen  When should I contact my healthcare provider? · You have a fever  · You have chills, a cough, or feel weak and achy      · You have pain and swelling in your muscles and joints  · Your skin is itchy, swollen, or you have a rash  · Your signs and symptoms return or get worse, even after treatment  · You have questions or concerns about your condition or care  CARE AGREEMENT:   You have the right to help plan your care  Learn about your health condition and how it may be treated  Discuss treatment options with your caregivers to decide what care you want to receive  You always have the right to refuse treatment  The above information is an  only  It is not intended as medical advice for individual conditions or treatments  Talk to your doctor, nurse or pharmacist before following any medical regimen to see if it is safe and effective for you  © 2017 [facility] Information is for End User's use only and may not be sold, redistributed or otherwise used for commercial purposes  All illustrations and images included in CareNotes® are the copyrighted property of [employer]  or [employer]

## 2019-02-15 NOTE — ASSESSMENT & PLAN NOTE
Anemia is stable followup with hematology 0.73     No results found for: AST  No results found for: GPT  No results found for: BILIRUBIN  No results found for: ALKPT  No results found for: ALBUMIN    WBC (K/mcL)   Date Value   02/11/2019 6.7     RBC (mil/mcL)   Date Value   02/11/2019 5.08     HCT (%)   Date Value   02/11/2019 43.9     HGB (g/dL)   Date Value   02/11/2019 14.6     PLT (K/mcL)   Date Value   02/11/2019 216        TSH (mcUnits/mL)   Date Value   02/11/2019 1.376        No results found for: CHOLESTEROL  No results found for: HDL  No results found for: CHOHDL  No results found for: TRIGLYCERIDE  No results found for: CALCLDL    VITAMIND, 25 HYDROXY (ng/mL)   Date Value   02/11/2019 45.9       No results found for: HGBA1C      Other significant problems:  Patient Active Problem List    Diagnosis Date Noted   • 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        Allergies:  ALLERGIES:  No Known Allergies    Past Medical  History/Surgeries:  No past medical history on file.    No past surgical history on file.    Family History:  Family History   Problem Relation Age of Onset   • Cancer Maternal Grandmother         Breast   • Diabetes Maternal Grandmother    • Psychiatric Paternal Grandmother    • Heart disease Paternal Grandfather        Social History:  Social History     Tobacco Use   • Smoking status: Never Smoker   • Smokeless tobacco: Never Used   Substance Use Topics   • Alcohol use: No       REVIEW OF SYSTEMS     Review of Systems   Constitutional: Negative for appetite change, chills and fever.   Respiratory: Negative for cough and shortness of breath.    Cardiovascular: Negative for chest pain.   Hematological:  written instructions.

## 2019-06-03 ENCOUNTER — OFFICE VISIT (OUTPATIENT)
Dept: FAMILY MEDICINE CLINIC | Facility: CLINIC | Age: 54
End: 2019-06-03
Payer: COMMERCIAL

## 2019-06-03 VITALS
SYSTOLIC BLOOD PRESSURE: 110 MMHG | DIASTOLIC BLOOD PRESSURE: 72 MMHG | BODY MASS INDEX: 27.13 KG/M2 | WEIGHT: 138.2 LBS | HEIGHT: 60 IN

## 2019-06-03 DIAGNOSIS — D59.10 AIHA (AUTOIMMUNE HEMOLYTIC ANEMIA) (HCC): ICD-10-CM

## 2019-06-03 DIAGNOSIS — E06.3 HYPOTHYROIDISM DUE TO HASHIMOTO'S THYROIDITIS: Primary | ICD-10-CM

## 2019-06-03 DIAGNOSIS — E03.8 HYPOTHYROIDISM DUE TO HASHIMOTO'S THYROIDITIS: Primary | ICD-10-CM

## 2019-06-03 DIAGNOSIS — E78.1 HYPERTRIGLYCERIDEMIA: ICD-10-CM

## 2019-06-03 DIAGNOSIS — M32.9 SYSTEMIC LUPUS ERYTHEMATOSUS, UNSPECIFIED SLE TYPE, UNSPECIFIED ORGAN INVOLVEMENT STATUS (HCC): ICD-10-CM

## 2019-06-03 DIAGNOSIS — E66.3 OVERWEIGHT (BMI 25.0-29.9): ICD-10-CM

## 2019-06-03 PROBLEM — D50.9 ANEMIA, IRON DEFICIENCY: Status: RESOLVED | Noted: 2017-12-08 | Resolved: 2019-06-03

## 2019-06-03 PROCEDURE — 1036F TOBACCO NON-USER: CPT | Performed by: FAMILY MEDICINE

## 2019-06-03 PROCEDURE — 3008F BODY MASS INDEX DOCD: CPT | Performed by: FAMILY MEDICINE

## 2019-06-03 PROCEDURE — 99214 OFFICE O/P EST MOD 30 MIN: CPT | Performed by: FAMILY MEDICINE

## 2019-07-02 DIAGNOSIS — E03.8 HYPOTHYROIDISM DUE TO HASHIMOTO'S THYROIDITIS: Primary | ICD-10-CM

## 2019-07-02 DIAGNOSIS — E06.3 HYPOTHYROIDISM DUE TO HASHIMOTO'S THYROIDITIS: Primary | ICD-10-CM

## 2019-07-02 RX ORDER — LEVOTHYROXINE SODIUM 0.1 MG/1
100 TABLET ORAL DAILY
Qty: 30 TABLET | Refills: 3 | Status: SHIPPED | OUTPATIENT
Start: 2019-07-02 | End: 2019-09-28 | Stop reason: SDUPTHER

## 2019-07-25 DIAGNOSIS — E03.9 ACQUIRED HYPOTHYROIDISM: ICD-10-CM

## 2019-07-25 RX ORDER — LEVOTHYROXINE SODIUM 88 UG/1
TABLET ORAL
Qty: 90 TABLET | Refills: 1 | OUTPATIENT
Start: 2019-07-25

## 2019-09-28 DIAGNOSIS — E06.3 HYPOTHYROIDISM DUE TO HASHIMOTO'S THYROIDITIS: ICD-10-CM

## 2019-09-28 DIAGNOSIS — E03.8 HYPOTHYROIDISM DUE TO HASHIMOTO'S THYROIDITIS: ICD-10-CM

## 2019-09-30 RX ORDER — LEVOTHYROXINE SODIUM 0.1 MG/1
TABLET ORAL
Qty: 90 TABLET | Refills: 1 | Status: SHIPPED | OUTPATIENT
Start: 2019-09-30 | End: 2020-03-23

## 2019-12-03 ENCOUNTER — OFFICE VISIT (OUTPATIENT)
Dept: FAMILY MEDICINE CLINIC | Facility: CLINIC | Age: 54
End: 2019-12-03
Payer: COMMERCIAL

## 2019-12-03 VITALS
BODY MASS INDEX: 26.31 KG/M2 | DIASTOLIC BLOOD PRESSURE: 72 MMHG | HEIGHT: 60 IN | WEIGHT: 134 LBS | SYSTOLIC BLOOD PRESSURE: 118 MMHG

## 2019-12-03 DIAGNOSIS — E06.3 HYPOTHYROIDISM DUE TO HASHIMOTO'S THYROIDITIS: ICD-10-CM

## 2019-12-03 DIAGNOSIS — Z13.220 SCREENING, LIPID: ICD-10-CM

## 2019-12-03 DIAGNOSIS — M32.9 SYSTEMIC LUPUS ERYTHEMATOSUS, UNSPECIFIED SLE TYPE, UNSPECIFIED ORGAN INVOLVEMENT STATUS (HCC): Primary | ICD-10-CM

## 2019-12-03 DIAGNOSIS — D59.10 AIHA (AUTOIMMUNE HEMOLYTIC ANEMIA) (HCC): ICD-10-CM

## 2019-12-03 DIAGNOSIS — E03.8 HYPOTHYROIDISM DUE TO HASHIMOTO'S THYROIDITIS: ICD-10-CM

## 2019-12-03 DIAGNOSIS — E66.3 OVERWEIGHT (BMI 25.0-29.9): ICD-10-CM

## 2019-12-03 PROBLEM — E78.1 HYPERTRIGLYCERIDEMIA: Status: RESOLVED | Noted: 2018-12-03 | Resolved: 2019-12-03

## 2019-12-03 PROCEDURE — 1036F TOBACCO NON-USER: CPT | Performed by: FAMILY MEDICINE

## 2019-12-03 PROCEDURE — 3008F BODY MASS INDEX DOCD: CPT | Performed by: FAMILY MEDICINE

## 2019-12-03 PROCEDURE — 99214 OFFICE O/P EST MOD 30 MIN: CPT | Performed by: FAMILY MEDICINE

## 2019-12-03 RX ORDER — PREDNISONE 1 MG/1
3 TABLET ORAL DAILY
COMMUNITY
End: 2020-06-05

## 2019-12-03 NOTE — PATIENT INSTRUCTIONS
Hypothyroidism   WHAT YOU NEED TO KNOW:   What is hypothyroidism? Hypothyroidism is a condition that develops when the thyroid gland does not make enough thyroid hormone  Thyroid hormones help control body temperature, heart rate, growth, and weight  What causes hypothyroidism? If you have a family member with hypothyroidism, your risk is increased  Any of the following can cause hypothyroidism:  · Autoimmune disease, such as inflammation of your thyroid, or Hashimoto disease    · Surgery, radiation therapy, or medicines such as lithium, sedatives, or narcotics    · Thyroid cancer or viral infection    · Low iodine levels  What are the signs and symptoms of hypothyroidism? The signs and symptoms may develop slowly, sometimes over several years  · Exhaustion    · Sensitivity to cold    · Headaches or decreased concentration    · Muscle aches or weakness    · Constipation     · Dry, flaky skin or brittle nails    · Thinning hair    · Heavy or irregular monthly periods    · Depression or irritability  How is hypothyroidism diagnosed? Your healthcare provider will ask about your symptoms and what medicines you take  He will ask about your medical history and if anyone in your family has hypothyroidism  A blood test will show your thyroid hormone level  How is hypothyroidism treated? Thyroid hormone replacement medicine may bring your thyroid hormone level back to normal  Ask your healthcare provider for more information on other medicines you may need  Call 911 for any of the following:   · You have sudden chest pain or shortness of breath  · You have a seizure  · You feel like you are going to faint  When should I seek immediate care? · You have diarrhea, tremors, or trouble sleeping  · Your legs, ankles, or feet are swollen  When should I contact my healthcare provider? · You have a fever  · You have chills, a cough, or feel weak and achy      · You have pain and swelling in your muscles and joints  · Your skin is itchy, swollen, or you have a rash  · Your signs and symptoms return or get worse, even after treatment  · You have questions or concerns about your condition or care  CARE AGREEMENT:   You have the right to help plan your care  Learn about your health condition and how it may be treated  Discuss treatment options with your caregivers to decide what care you want to receive  You always have the right to refuse treatment  The above information is an  only  It is not intended as medical advice for individual conditions or treatments  Talk to your doctor, nurse or pharmacist before following any medical regimen to see if it is safe and effective for you  © 2017 [facility] Information is for End User's use only and may not be sold, redistributed or otherwise used for commercial purposes  All illustrations and images included in CareNotes® are the copyrighted property of A D A M , Inc  or [employer]

## 2019-12-03 NOTE — PROGRESS NOTES
Assessment/Plan:    Systemic lupus erythematosus ([facility] )  Continue with current meds and follwoup with the specilaist    Overweight (BMI 25 0-29  9)  Weight is stable Patient to continue current care patient will see me in 6 months    Hypothyroidism due to Hashimoto's thyroiditis  TSh was stable continue meds and repeat labs in 6 months    AIHA (autoimmune hemolytic anemia) (HCC)  Cbc is stable continue current meds and followup with the specialist       Diagnoses and all orders for this visit:    Systemic lupus erythematosus, unspecified SLE type, unspecified organ involvement status ([facility] )  -     Comprehensive metabolic panel; Future    Hypothyroidism due to Hashimoto's thyroiditis  -     Comprehensive metabolic panel; Future  -     TSH, 3rd generation with Free T4 reflex; Future    Overweight (BMI 25 0-29 9)  -     Comprehensive metabolic panel; Future  -     Lipid panel; Future    AIHA (autoimmune hemolytic anemia) (HCC)    Screening, lipid  -     Lipid panel; Future    Other orders  -     predniSONE 1 mg tablet; Take 3 tablets by mouth daily          Subjective:   Chief Complaint   Patient presents with    Hypothyroidism     no refills needed  Patient ID: Oksana Humphreys is a 48 y o  female    The following portions of the patient's history were reviewed and updated as appropriate: allergies, current medications, past medical history, past social history and problem list   Patient is here for followup of hemolytic anemia, lupus, her weight and hypothyroidism Patient is overall doing well Patient is tapering down the steroids per the specialist Her last labs showed her hemoglobin was stable patient is tolerating the decreasein meds She is up to date with screenings She did have flu shot Her weight is also stable She is walking daily She has no new concerns Her last mammogram and Gyn exam were reveiwed today          Review of Systems   Constitutional: Negative for fatigue, fever and unexpected weight change  HENT: Negative for congestion, sinus pain and trouble swallowing  Eyes: Negative for discharge and visual disturbance  Respiratory: Negative for cough, chest tightness, shortness of breath and wheezing  Cardiovascular: Negative for chest pain, palpitations and leg swelling  Gastrointestinal: Negative for abdominal pain, blood in stool, constipation, diarrhea, nausea and vomiting  Genitourinary: Negative for difficulty urinating, dysuria, frequency and hematuria  Musculoskeletal: Negative for arthralgias, gait problem and joint swelling  Skin: Negative for rash and wound  Allergic/Immunologic: Negative for environmental allergies and food allergies  Neurological: Negative for dizziness, syncope, weakness, numbness and headaches  Hematological: Negative for adenopathy  Does not bruise/bleed easily  Psychiatric/Behavioral: Negative for confusion, decreased concentration and sleep disturbance  The patient is not nervous/anxious  Objective:      /72   Ht 5' (1 524 m)   Wt 60 8 kg (134 lb)   BMI 26 17 kg/m²          Physical Exam   Constitutional: She is oriented to person, place, and time  She appears well-developed and well-nourished  HENT:   Head: Normocephalic and atraumatic  Right Ear: Hearing, tympanic membrane and external ear normal    Left Ear: Hearing, tympanic membrane and external ear normal    Eyes: Pupils are equal, round, and reactive to light  Conjunctivae and EOM are normal    Neck: Neck supple  No thyromegaly present  Cardiovascular: Normal rate and normal heart sounds  Pulmonary/Chest: Effort normal and breath sounds normal  She has no wheezes  She has no rales  Abdominal: Soft  Bowel sounds are normal  She exhibits no distension  There is no tenderness  Musculoskeletal: She exhibits no edema or tenderness  Lymphadenopathy:     She has no cervical adenopathy  Neurological: She is alert and oriented to person, place, and time   No cranial nerve deficit  Coordination normal    Skin: Skin is warm and dry  No rash noted  Psychiatric: She has a normal mood and affect  Her behavior is normal  Judgment and thought content normal    Nursing note and vitals reviewed

## 2020-03-23 DIAGNOSIS — E06.3 HYPOTHYROIDISM DUE TO HASHIMOTO'S THYROIDITIS: ICD-10-CM

## 2020-03-23 DIAGNOSIS — E03.8 HYPOTHYROIDISM DUE TO HASHIMOTO'S THYROIDITIS: ICD-10-CM

## 2020-03-23 RX ORDER — LEVOTHYROXINE SODIUM 0.1 MG/1
TABLET ORAL
Qty: 90 TABLET | Refills: 0 | Status: SHIPPED | OUTPATIENT
Start: 2020-03-23 | End: 2020-06-19

## 2020-06-05 ENCOUNTER — OFFICE VISIT (OUTPATIENT)
Dept: FAMILY MEDICINE CLINIC | Facility: CLINIC | Age: 55
End: 2020-06-05
Payer: COMMERCIAL

## 2020-06-05 VITALS
TEMPERATURE: 98 F | HEIGHT: 60 IN | WEIGHT: 130 LBS | DIASTOLIC BLOOD PRESSURE: 78 MMHG | SYSTOLIC BLOOD PRESSURE: 118 MMHG | BODY MASS INDEX: 25.52 KG/M2

## 2020-06-05 DIAGNOSIS — E03.8 HYPOTHYROIDISM DUE TO HASHIMOTO'S THYROIDITIS: Primary | ICD-10-CM

## 2020-06-05 DIAGNOSIS — E66.3 OVERWEIGHT: ICD-10-CM

## 2020-06-05 DIAGNOSIS — D59.10 AIHA (AUTOIMMUNE HEMOLYTIC ANEMIA) (HCC): ICD-10-CM

## 2020-06-05 DIAGNOSIS — E06.3 HYPOTHYROIDISM DUE TO HASHIMOTO'S THYROIDITIS: Primary | ICD-10-CM

## 2020-06-05 DIAGNOSIS — M32.9 SYSTEMIC LUPUS ERYTHEMATOSUS, UNSPECIFIED SLE TYPE, UNSPECIFIED ORGAN INVOLVEMENT STATUS (HCC): ICD-10-CM

## 2020-06-05 PROCEDURE — 99214 OFFICE O/P EST MOD 30 MIN: CPT | Performed by: FAMILY MEDICINE

## 2020-06-05 PROCEDURE — 3008F BODY MASS INDEX DOCD: CPT | Performed by: FAMILY MEDICINE

## 2020-06-05 PROCEDURE — 1036F TOBACCO NON-USER: CPT | Performed by: FAMILY MEDICINE

## 2020-06-05 RX ORDER — TRIAMCINOLONE ACETONIDE 1 MG/G
CREAM TOPICAL 2 TIMES DAILY
COMMUNITY
Start: 2020-05-15 | End: 2021-05-15

## 2020-06-05 RX ORDER — PREDNISONE 1 MG/1
5 TABLET ORAL DAILY
COMMUNITY
Start: 2020-05-15

## 2020-06-19 DIAGNOSIS — E03.8 HYPOTHYROIDISM DUE TO HASHIMOTO'S THYROIDITIS: ICD-10-CM

## 2020-06-19 DIAGNOSIS — E06.3 HYPOTHYROIDISM DUE TO HASHIMOTO'S THYROIDITIS: ICD-10-CM

## 2020-06-19 RX ORDER — LEVOTHYROXINE SODIUM 0.1 MG/1
TABLET ORAL
Qty: 90 TABLET | Refills: 0 | Status: SHIPPED | OUTPATIENT
Start: 2020-06-19 | End: 2020-09-15

## 2020-09-15 DIAGNOSIS — E06.3 HYPOTHYROIDISM DUE TO HASHIMOTO'S THYROIDITIS: ICD-10-CM

## 2020-09-15 DIAGNOSIS — E03.8 HYPOTHYROIDISM DUE TO HASHIMOTO'S THYROIDITIS: ICD-10-CM

## 2020-09-15 RX ORDER — LEVOTHYROXINE SODIUM 0.1 MG/1
TABLET ORAL
Qty: 90 TABLET | Refills: 0 | Status: SHIPPED | OUTPATIENT
Start: 2020-09-15 | End: 2020-12-15

## 2020-12-10 ENCOUNTER — OFFICE VISIT (OUTPATIENT)
Dept: FAMILY MEDICINE CLINIC | Facility: CLINIC | Age: 55
End: 2020-12-10
Payer: COMMERCIAL

## 2020-12-10 VITALS
HEIGHT: 60 IN | BODY MASS INDEX: 25.4 KG/M2 | WEIGHT: 129.4 LBS | SYSTOLIC BLOOD PRESSURE: 118 MMHG | DIASTOLIC BLOOD PRESSURE: 78 MMHG | TEMPERATURE: 98.3 F

## 2020-12-10 DIAGNOSIS — Z13.220 SCREENING, LIPID: ICD-10-CM

## 2020-12-10 DIAGNOSIS — D59.10 AIHA (AUTOIMMUNE HEMOLYTIC ANEMIA) (HCC): ICD-10-CM

## 2020-12-10 DIAGNOSIS — E03.8 HYPOTHYROIDISM DUE TO HASHIMOTO'S THYROIDITIS: Primary | ICD-10-CM

## 2020-12-10 DIAGNOSIS — M32.9 SYSTEMIC LUPUS ERYTHEMATOSUS, UNSPECIFIED SLE TYPE, UNSPECIFIED ORGAN INVOLVEMENT STATUS (HCC): ICD-10-CM

## 2020-12-10 DIAGNOSIS — E66.3 OVERWEIGHT: ICD-10-CM

## 2020-12-10 DIAGNOSIS — Z79.899 HIGH RISK MEDICATION USE: ICD-10-CM

## 2020-12-10 DIAGNOSIS — E06.3 HYPOTHYROIDISM DUE TO HASHIMOTO'S THYROIDITIS: Primary | ICD-10-CM

## 2020-12-10 PROCEDURE — 99214 OFFICE O/P EST MOD 30 MIN: CPT | Performed by: FAMILY MEDICINE

## 2020-12-10 PROCEDURE — 1036F TOBACCO NON-USER: CPT | Performed by: FAMILY MEDICINE

## 2020-12-10 PROCEDURE — 3008F BODY MASS INDEX DOCD: CPT | Performed by: FAMILY MEDICINE

## 2020-12-15 DIAGNOSIS — E03.8 HYPOTHYROIDISM DUE TO HASHIMOTO'S THYROIDITIS: ICD-10-CM

## 2020-12-15 DIAGNOSIS — E06.3 HYPOTHYROIDISM DUE TO HASHIMOTO'S THYROIDITIS: ICD-10-CM

## 2020-12-15 RX ORDER — LEVOTHYROXINE SODIUM 0.1 MG/1
TABLET ORAL
Qty: 90 TABLET | Refills: 0 | Status: SHIPPED | OUTPATIENT
Start: 2020-12-15 | End: 2021-03-24

## 2021-03-24 DIAGNOSIS — E03.8 HYPOTHYROIDISM DUE TO HASHIMOTO'S THYROIDITIS: ICD-10-CM

## 2021-03-24 DIAGNOSIS — E06.3 HYPOTHYROIDISM DUE TO HASHIMOTO'S THYROIDITIS: ICD-10-CM

## 2021-03-24 RX ORDER — LEVOTHYROXINE SODIUM 0.1 MG/1
TABLET ORAL
Qty: 90 TABLET | Refills: 0 | Status: SHIPPED | OUTPATIENT
Start: 2021-03-24 | End: 2021-06-21

## 2021-06-04 ENCOUNTER — RA CDI HCC (OUTPATIENT)
Dept: OTHER | Facility: HOSPITAL | Age: 56
End: 2021-06-04

## 2021-06-04 NOTE — PROGRESS NOTES
[facility]  coding opportunities     DX used        Chart reviewed, (number of) suggestions sent to provider: 2            Number of suggestions actually used: 2         Patients insurance company: [address]  (Medicare Advantage and [employer])     Visit status: Patient arrived for their scheduled appointment     Provider never responded to [facility] 75  coding request       [facility] 75  coding opportunities             Chart reviewed, (number of) suggestions sent to provider: 2            Number of suggestions actually used: 2         Patients insurance company: [address]  (Medicare Advantage and [employer])     Visit status: Patient arrived for their scheduled appointment     Provider never responded to [facility] 75  coding request     [facility] 75  coding opportunities        DX: M32 9 Systemic lupus erythematosus, unspecified  DX: D59 10 Autoimmune hemolytic anemia, unspecified         Chart reviewed, (number of) suggestions sent to provider: 2           Patients insurance company: [address]  (Medicare Advantage and [employer])

## 2021-06-10 ENCOUNTER — OFFICE VISIT (OUTPATIENT)
Dept: FAMILY MEDICINE CLINIC | Facility: CLINIC | Age: 56
End: 2021-06-10
Payer: COMMERCIAL

## 2021-06-10 VITALS
HEIGHT: 60 IN | DIASTOLIC BLOOD PRESSURE: 72 MMHG | TEMPERATURE: 97.7 F | WEIGHT: 127 LBS | SYSTOLIC BLOOD PRESSURE: 100 MMHG | BODY MASS INDEX: 24.94 KG/M2

## 2021-06-10 DIAGNOSIS — E03.8 HYPOTHYROIDISM DUE TO HASHIMOTO'S THYROIDITIS: ICD-10-CM

## 2021-06-10 DIAGNOSIS — E06.3 HYPOTHYROIDISM DUE TO HASHIMOTO'S THYROIDITIS: ICD-10-CM

## 2021-06-10 DIAGNOSIS — M32.9 SYSTEMIC LUPUS ERYTHEMATOSUS, UNSPECIFIED SLE TYPE, UNSPECIFIED ORGAN INVOLVEMENT STATUS (HCC): Primary | ICD-10-CM

## 2021-06-10 DIAGNOSIS — D59.10 AIHA (AUTOIMMUNE HEMOLYTIC ANEMIA) (HCC): ICD-10-CM

## 2021-06-10 DIAGNOSIS — Z12.4 SCREENING FOR CERVICAL CANCER: ICD-10-CM

## 2021-06-10 PROBLEM — E66.3 OVERWEIGHT: Status: RESOLVED | Noted: 2019-06-03 | Resolved: 2021-06-10

## 2021-06-10 PROCEDURE — 3725F SCREEN DEPRESSION PERFORMED: CPT | Performed by: FAMILY MEDICINE

## 2021-06-10 PROCEDURE — 3008F BODY MASS INDEX DOCD: CPT | Performed by: FAMILY MEDICINE

## 2021-06-10 PROCEDURE — 1036F TOBACCO NON-USER: CPT | Performed by: FAMILY MEDICINE

## 2021-06-10 PROCEDURE — 99214 OFFICE O/P EST MOD 30 MIN: CPT | Performed by: FAMILY MEDICINE

## 2021-06-10 NOTE — PROGRESS NOTES
Assessment/Plan:    AIHA (autoimmune hemolytic anemia) (HCC)  CBC is stable continue iron and followup in 6 motnhs    Hypothyroidism due to Hashimoto's thyroiditis  Thyroid numbers are good continue meds and repeat labs in 6 months    Systemic lupus erythematosus [facility])  Reviewed rheumatology notes with patient Patient is to continue curren care and follwoup with me in  6months       Diagnoses and all orders for this visit:    Systemic lupus erythematosus, unspecified SLE type, unspecified organ involvement status ([facility] )    AIHA (autoimmune hemolytic anemia) ([facility] )    Hypothyroidism due to Hashimoto's thyroiditis  -     TSH, 3rd generation with Free T4 reflex; Future    Screening for cervical cancer  -     Ambulatory referral to Obstetrics / Gynecology; Future          Subjective:   Chief Complaint   Patient presents with    Follow-up    Hypothyroidism          Patient ID: Suzan Field is a 50 y o  female  Patient is here for followup of hypothyroidism systemic lupus and also autoimmune hemolytic anemia Patient is overall doing well She is tolerating her plaquenil She is taking her iron and her thyroid meds Patient feeling well She had her pap done She has mammogram scheduled Patient denies any complaints at his time She saw her eye doctor and had covid shots done       The following portions of the patient's history were reviewed and updated as appropriate: allergies, current medications, past family history, past medical history, past social history, past surgical history and problem list     Review of Systems   Constitutional: Negative for fatigue, fever and unexpected weight change  HENT: Negative for congestion, sinus pain and trouble swallowing  Eyes: Negative for discharge and visual disturbance  Respiratory: Negative for cough, chest tightness, shortness of breath and wheezing  Cardiovascular: Negative for chest pain, palpitations and leg swelling     Gastrointestinal: Negative for abdominal pain, blood in stool, constipation, diarrhea, nausea and vomiting  Genitourinary: Negative for difficulty urinating, dysuria, frequency and hematuria  Last period was 12/4/2020 She had PAP done in last one to two months   Musculoskeletal: Negative for arthralgias, gait problem and joint swelling  Skin: Negative for rash and wound  Allergic/Immunologic: Negative for environmental allergies and food allergies  Neurological: Negative for dizziness, syncope, weakness, numbness and headaches  Hematological: Negative for adenopathy  Does not bruise/bleed easily  Psychiatric/Behavioral: Negative for confusion, decreased concentration and sleep disturbance  The patient is not nervous/anxious  Objective:      /72   Temp 97 7 °F (36 5 °C)   Ht 5' (1 524 m)   Wt 57 6 kg (127 lb)   BMI 24 80 kg/m²          Physical Exam  Vitals signs and nursing note reviewed  Constitutional:       Appearance: Normal appearance  She is well-developed  HENT:      Head: Normocephalic and atraumatic  Right Ear: Hearing, tympanic membrane and external ear normal       Left Ear: Hearing, tympanic membrane and external ear normal    Eyes:      Extraocular Movements: Extraocular movements intact  Conjunctiva/sclera: Conjunctivae normal       Pupils: Pupils are equal, round, and reactive to light  Neck:      Musculoskeletal: Neck supple  Thyroid: No thyromegaly  Cardiovascular:      Rate and Rhythm: Normal rate and regular rhythm  Pulses: Normal pulses  Heart sounds: Normal heart sounds  Pulmonary:      Effort: Pulmonary effort is normal       Breath sounds: Normal breath sounds  No wheezing or rales  Abdominal:      General: Bowel sounds are normal  There is no distension  Palpations: Abdomen is soft  Tenderness: There is no abdominal tenderness  Musculoskeletal:         General: No tenderness  Lymphadenopathy:      Cervical: No cervical adenopathy     Skin: General: Skin is warm and dry  Findings: No rash  Neurological:      General: No focal deficit present  Mental Status: She is alert and oriented to person, place, and time  Cranial Nerves: No cranial nerve deficit  Coordination: Coordination normal    Psychiatric:         Mood and Affect: Mood normal          Behavior: Behavior normal          Thought Content:  Thought content normal          Judgment: Judgment normal

## 2021-06-10 NOTE — PATIENT INSTRUCTIONS
Hypothyroidism   WHAT YOU NEED TO KNOW:   What is hypothyroidism? Hypothyroidism is a condition that develops when the thyroid gland does not make enough thyroid hormone  Thyroid hormones help control body temperature, heart rate, growth, and weight  What causes or increases my risk for hypothyroidism? · A family history of hypothyroidism    · Age 61 years or older or being female    · Autoimmune disease, such as inflammation of your thyroid, or Hashimoto disease    · Thyroid surgery, head or neck radiation therapy, or medicines such as lithium, sedatives, or narcotics    · Thyroid cancer, a goiter, or a viral infection    · Low iodine level    · Down syndrome or Lucas syndrome    What are the signs and symptoms of hypothyroidism? The signs and symptoms may develop slowly, sometimes over several years  · Exhaustion, depression, or irritability    · Sensitivity to cold    · Muscle aches, headaches, weakness, or trouble concentrating    · Dry, flaky skin, brittle nails, or thinning hair    · Recent weight gain without overeating, or constipation    · Heavy or irregular monthly periods    · Puffiness around your eyes or swelling in your hands and feet    · Low heart rate, changes in your blood pressure    How is hypothyroidism diagnosed? Your healthcare provider will ask about your symptoms and what medicines you take  He or she will ask about your medical history and if anyone in your family has hypothyroidism  A blood test will show your thyroid hormone level  How is hypothyroidism treated? Thyroid hormone replacement medicine may bring your thyroid hormone level back to normal  You will need to take this medicine for the rest of your life to control hypothyroidism  It is important to take the medicine every day as directed  You will be given instructions for what to do if you miss a dose  Ask your healthcare provider for more information on other medicines you may need  How can I manage hypothyroidism? · Get more iodine  The thyroid gland uses iodine to work correctly and to make thyroid hormones  Your healthcare provider may tell you to eat foods that are rich in iodine  He or she will tell you how much of these foods to eat  Milk and seafood are good sources of iodine  You may also need iodine supplements  · Keep track of your blood pressure and weight:      ? Check your blood pressure  and write it down as often as directed  It is important to measure your blood pressure on the same arm and in the same position each time  Keep track of your blood pressure readings, along with the date and time you took them  Take this record with you to your followup visits  ? Weigh yourself daily  before breakfast after you urinate  Weight gain may be a sign of extra fluid in your body  Keep track of your daily weights and take the record with you to your followup visits  Call your local emergency number (911 in the [address] [place] [address],[address]) or have someone call if:   · You have sudden chest pain or shortness of breath  · You have a seizure  · You feel like you are going to faint  When should I seek immediate care? · You have diarrhea, tremors, or trouble sleeping  · Your legs, ankles, or feet are swollen  When should I call my doctor? · You have a fever  · You have chills, a cough, or feel weak and achy  · You have pain and swelling in your muscles and joints  · Your skin is itchy, swollen, or you have a rash  · Your signs and symptoms return or get worse, even after treatment  · You have questions or concerns about your condition or care  CARE AGREEMENT:   You have the right to help plan your care  Learn about your health condition and how it may be treated  Discuss treatment options with your healthcare providers to decide what care you want to receive  You always have the right to refuse treatment  The above information is an  only   It is not intended as medical advice for individual conditions or treatments  Talk to your doctor, nurse or pharmacist before following any medical regimen to see if it is safe and effective for you  © Copyright [address] Information is for End User's use only and may not be sold, redistributed or otherwise used for commercial purposes   All illustrations and images included in CareNotes® are the copyrighted property of A D A M , Inc  or [address] [place] [address]

## 2021-06-21 DIAGNOSIS — E06.3 HYPOTHYROIDISM DUE TO HASHIMOTO'S THYROIDITIS: ICD-10-CM

## 2021-06-21 DIAGNOSIS — E03.8 HYPOTHYROIDISM DUE TO HASHIMOTO'S THYROIDITIS: ICD-10-CM

## 2021-06-21 RX ORDER — LEVOTHYROXINE SODIUM 0.1 MG/1
TABLET ORAL
Qty: 90 TABLET | Refills: 0 | Status: SHIPPED | OUTPATIENT
Start: 2021-06-21 | End: 2021-07-02 | Stop reason: SDUPTHER

## 2021-07-02 DIAGNOSIS — E06.3 HYPOTHYROIDISM DUE TO HASHIMOTO'S THYROIDITIS: ICD-10-CM

## 2021-07-02 DIAGNOSIS — E03.8 HYPOTHYROIDISM DUE TO HASHIMOTO'S THYROIDITIS: ICD-10-CM

## 2021-07-02 RX ORDER — LEVOTHYROXINE SODIUM 0.1 MG/1
100 TABLET ORAL DAILY
Qty: 90 TABLET | Refills: 0 | Status: SHIPPED | OUTPATIENT
Start: 2021-07-02 | End: 2021-09-27

## 2021-09-27 DIAGNOSIS — E06.3 HYPOTHYROIDISM DUE TO HASHIMOTO'S THYROIDITIS: ICD-10-CM

## 2021-09-27 DIAGNOSIS — E03.8 HYPOTHYROIDISM DUE TO HASHIMOTO'S THYROIDITIS: ICD-10-CM

## 2021-09-27 RX ORDER — LEVOTHYROXINE SODIUM 0.1 MG/1
TABLET ORAL
Qty: 90 TABLET | Refills: 0 | Status: SHIPPED | OUTPATIENT
Start: 2021-09-27 | End: 2021-12-09 | Stop reason: SDUPTHER

## 2021-12-03 ENCOUNTER — RA CDI HCC (OUTPATIENT)
Dept: OTHER | Facility: HOSPITAL | Age: 56
End: 2021-12-03

## 2021-12-09 ENCOUNTER — OFFICE VISIT (OUTPATIENT)
Dept: FAMILY MEDICINE CLINIC | Facility: CLINIC | Age: 56
End: 2021-12-09
Payer: COMMERCIAL

## 2021-12-09 VITALS
SYSTOLIC BLOOD PRESSURE: 120 MMHG | HEIGHT: 60 IN | TEMPERATURE: 97.6 F | WEIGHT: 133.8 LBS | BODY MASS INDEX: 26.27 KG/M2 | DIASTOLIC BLOOD PRESSURE: 82 MMHG

## 2021-12-09 DIAGNOSIS — D59.10 AIHA (AUTOIMMUNE HEMOLYTIC ANEMIA) (HCC): ICD-10-CM

## 2021-12-09 DIAGNOSIS — E06.3 HYPOTHYROIDISM DUE TO HASHIMOTO'S THYROIDITIS: ICD-10-CM

## 2021-12-09 DIAGNOSIS — Z23 NEED FOR VACCINATION: ICD-10-CM

## 2021-12-09 DIAGNOSIS — E03.8 HYPOTHYROIDISM DUE TO HASHIMOTO'S THYROIDITIS: ICD-10-CM

## 2021-12-09 DIAGNOSIS — Z13.1 SCREENING FOR DIABETES MELLITUS: ICD-10-CM

## 2021-12-09 DIAGNOSIS — Z13.220 SCREENING, LIPID: ICD-10-CM

## 2021-12-09 DIAGNOSIS — M32.9 SYSTEMIC LUPUS ERYTHEMATOSUS, UNSPECIFIED SLE TYPE, UNSPECIFIED ORGAN INVOLVEMENT STATUS (HCC): Primary | ICD-10-CM

## 2021-12-09 PROCEDURE — 90471 IMMUNIZATION ADMIN: CPT | Performed by: FAMILY MEDICINE

## 2021-12-09 PROCEDURE — 90682 RIV4 VACC RECOMBINANT DNA IM: CPT | Performed by: FAMILY MEDICINE

## 2021-12-09 PROCEDURE — 3008F BODY MASS INDEX DOCD: CPT | Performed by: FAMILY MEDICINE

## 2021-12-09 PROCEDURE — 99214 OFFICE O/P EST MOD 30 MIN: CPT | Performed by: FAMILY MEDICINE

## 2021-12-09 PROCEDURE — 1036F TOBACCO NON-USER: CPT | Performed by: FAMILY MEDICINE

## 2021-12-09 RX ORDER — LEVOTHYROXINE SODIUM 0.1 MG/1
100 TABLET ORAL DAILY
Qty: 90 TABLET | Refills: 1 | Status: SHIPPED | OUTPATIENT
Start: 2021-12-09 | End: 2022-06-13

## 2022-06-11 DIAGNOSIS — E06.3 HYPOTHYROIDISM DUE TO HASHIMOTO'S THYROIDITIS: ICD-10-CM

## 2022-06-11 DIAGNOSIS — E03.8 HYPOTHYROIDISM DUE TO HASHIMOTO'S THYROIDITIS: ICD-10-CM

## 2022-06-13 RX ORDER — LEVOTHYROXINE SODIUM 0.1 MG/1
TABLET ORAL
Qty: 90 TABLET | Refills: 1 | Status: SHIPPED | OUTPATIENT
Start: 2022-06-13